# Patient Record
Sex: MALE | Race: WHITE | NOT HISPANIC OR LATINO | Employment: OTHER | URBAN - METROPOLITAN AREA
[De-identification: names, ages, dates, MRNs, and addresses within clinical notes are randomized per-mention and may not be internally consistent; named-entity substitution may affect disease eponyms.]

---

## 2023-11-20 PROBLEM — R18.8 OTHER ASCITES: Status: ACTIVE | Noted: 2023-11-20

## 2023-11-20 PROBLEM — Z72.0 TOBACCO ABUSE: Status: ACTIVE | Noted: 2023-11-20

## 2023-11-20 PROBLEM — J41.0 SIMPLE CHRONIC BRONCHITIS: Status: ACTIVE | Noted: 2023-11-20

## 2023-11-20 PROBLEM — K80.20 CALCULUS OF GALLBLADDER WITHOUT CHOLECYSTITIS WITHOUT OBSTRUCTION: Status: ACTIVE | Noted: 2023-11-20

## 2023-11-20 PROBLEM — R91.1 PULMONARY NODULE: Status: ACTIVE | Noted: 2023-11-20

## 2023-12-21 ENCOUNTER — HOSPITAL ENCOUNTER (OUTPATIENT)
Dept: RADIOLOGY | Facility: HOSPITAL | Age: 68
Discharge: HOME | End: 2023-12-21
Attending: INTERNAL MEDICINE
Payer: MEDICARE

## 2023-12-21 DIAGNOSIS — R91.1 PULMONARY NODULE: ICD-10-CM

## 2023-12-21 LAB — GLUCOSE SERPL-MCNC: 148 MG/DL (ref 70–110)

## 2023-12-21 PROCEDURE — 78815 NM PET CT FDG SKULL BASE TO MID THIGH: ICD-10-PCS | Mod: 26,PI,, | Performed by: RADIOLOGY

## 2023-12-21 PROCEDURE — A9552 F18 FDG: HCPCS | Mod: PN

## 2023-12-21 PROCEDURE — 78815 PET IMAGE W/CT SKULL-THIGH: CPT | Mod: 26,PI,, | Performed by: RADIOLOGY

## 2023-12-21 NOTE — PROGRESS NOTES
PET Imaging Questionnaire    Are you a Diabetic? Recent Blood Sugar level? Yes    Are you anemic? Bone Marrow Stimulation Meds? No    Have you had a CT Scan, if so when & where was your last one? Yes -     Have you had a PET Scan, if so when & where was your last one? No    Chemotherapy or currently on Chemotherapy? No    Radiation therapy? No    Surgical History:   Past Surgical History:   Procedure Laterality Date    APPENDECTOMY      EAR REPAIR RIGHT      ENDOBRONCHIAL ULTRASOUND Bilateral 12/11/2023    Procedure: ENDOBRONCHIAL ULTRASOUND (EBUS);  Surgeon: Malik Ho MD;  Location: [facility], [place] OR;  Service: Pulmonary;  Laterality: Bilateral;    JAW RECONSTRUCTION      LARYNGOGEAL MASS REMOVED      MELANOMA EXCISION RIGHT SHOULDER      ROBOTIC BRONCHOSCOPY Bilateral 12/11/2023    Procedure: ROBOTIC BRONCHOSCOPY;  Surgeon: Malik Ho MD;  Location: [facility] [place] OR;  Service: Pulmonary;  Laterality: Bilateral;    SINUS SURGERY      VASCULAR SURGERY  2013    right leg arthrectomy; Dr. Steinberg        Have you been fasting for at least 6 hours? Yes    Is there any chance you may be pregnant or breastfeeding? No    Assay: 12.15 MCi@:7.30   Injection Site:rt ac     Residual: 1.08 mCi@: 7.32   Technologist: Geraldine Salmeron Injected:11.07mCi

## 2023-12-26 PROBLEM — C34.31 SQUAMOUS CELL CARCINOMA OF LOWER LOBE OF RIGHT LUNG: Status: ACTIVE | Noted: 2023-12-26

## 2023-12-26 PROBLEM — J43.2 CENTRILOBULAR EMPHYSEMA: Status: ACTIVE | Noted: 2023-12-26

## 2023-12-27 DIAGNOSIS — C34.31 SQUAMOUS CELL CARCINOMA OF LOWER LOBE OF RIGHT LUNG: Primary | ICD-10-CM

## 2024-01-02 NOTE — H&P (VIEW-ONLY)
History & Physical    SUBJECTIVE:     History of Present Illness:  Patient is a 68 y.o. male smoker with DM on insulin, COPD, melanoma and PAD (angioplasty w/o stent) here today for RLL squamous cell carcinoma. Admitted to [facility] with food poisoning and during work-up underwent CT abdomen which identified lung nodule measuring 1.2 cm previously 0.5cm on CT Jan 2023. Referred to pulmonary. Repeat CT 12/1/23 with interval progression to 1.5 cm RLL nodule. No other nodules or adenopathy. Bronchoscopy 12/11/23: RLL squamous cell carcinoma. 4L and 7 negative for malignancy. PET with RLL nodule SUV 6.1, no mediastinal or hilar avidity. Chronic cough with phlegm. Claudication ~ 50 yds. Occasional resting leg pain. Not on blood thinners. On pentoxifylline.     Smoker. 2-3ppd smoker however down to 1.5 ppd.   PSH: jaw reconstruction with plates - tracheostomy with decannulation within month of placement, excision of melanoma R shoulder, appendectomy, laryngeal vs VC mass biopsy      Good neck extension.   No issues with placement of 9-0 ETT. Good view of cords.     Chief Complaint   Patient presents with    Consult     Review of patient's allergies indicates:  No Known Allergies    Current Outpatient Medications   Medication Sig Dispense Refill    albuterol (PROVENTIL/VENTOLIN HFA) 90 mcg/actuation inhaler INHALE 2 INHALATIONS BY ORAL INHALATION FOUR TIMES A DAY AS NEEDED      aspirin (ECOTRIN) 81 MG EC tablet Take 1 tablet by mouth every morning.      buPROPion (WELLBUTRIN SR) 150 MG TBSR 12 hr tablet 150 mg.      EScitalopram oxalate (LEXAPRO) 20 MG tablet Take 1 tablet by mouth every evening.      fluticasone propionate (FLONASE) 50 mcg/actuation nasal spray 1 spray by Nasal route daily as needed.      JANUMET 50-1,000 mg per tablet Take 1 tablet by mouth 2 (two) times daily with meals.      LANTUS SOLOSTAR U-100 INSULIN glargine 100 units/mL SubQ pen Inject 20 Units into the skin every evening.      lisinopriL  "(PRINIVIL,ZESTRIL) 5 MG tablet Take 1 tablet by mouth once daily.      pen needle, diabetic 32 gauge x 5/32" Ndle Inject 2 each into the skin.      pentoxifylline (TRENTAL) 400 mg TbSR Take 1 tablet by mouth 3 (three) times daily with meals.      rosuvastatin (CRESTOR) 10 MG tablet Take 1 tablet by mouth every evening.      tiotropium-olodateroL (STIOLTO RESPIMAT) 2.5-2.5 mcg/actuation Mist Inhale 1 puff into the lungs once daily. Controller 1 g 5    VICTOZA 2-LADARIUS 0.6 mg/0.1 mL (18 mg/3 mL) PnIj pen INJECT 0.6 MG INTO THE SKIN IN THE MORNING.       No current facility-administered medications for this visit.       Past Medical History:   Diagnosis Date    Cancer     MELANOMA AND SQUAMOUS CELL    COPD (chronic obstructive pulmonary disease)     Diabetes mellitus     Pulmonary nodule     Renal disorder 11/2023    ZAC     Past Surgical History:   Procedure Laterality Date    APPENDECTOMY      EAR REPAIR RIGHT      ENDOBRONCHIAL ULTRASOUND Bilateral 12/11/2023    Procedure: ENDOBRONCHIAL ULTRASOUND (EBUS);  Surgeon: Malik Ho MD;  Location: [facility] OR;  Service: Pulmonary;  Laterality: Bilateral;    JAW RECONSTRUCTION      LARYNGOGEAL MASS REMOVED      MELANOMA EXCISION RIGHT SHOULDER      ROBOTIC BRONCHOSCOPY Bilateral 12/11/2023    Procedure: ROBOTIC BRONCHOSCOPY;  Surgeon: Malik Ho MD;  Location: [facility] OR;  Service: Pulmonary;  Laterality: Bilateral;    SINUS SURGERY      VASCULAR SURGERY  2013    right leg arthrectomy; Dr. Steinberg     Family History   Problem Relation Age of Onset    Cancer Mother         BREAST CANCER    Asthma Mother     Hypertension Father     Cancer Father         LUNG CANCER ANGENT ORANGE EXPOSURE     Social History     Tobacco Use    Smoking status: Every Day     Current packs/day: 1.00     Average packs/day: 2.9 packs/day for 54.0 years (156.0 ttl pk-yrs)     Types: Cigarettes     Start date: 1970    Smokeless tobacco: Never   Substance Use Topics    Alcohol use: Not Currently    " "Drug use: Not Currently        Review of Systems:  Review of Systems   Constitutional:  Negative for fatigue and fever.   Respiratory:  Positive for cough. Negative for shortness of breath.    Cardiovascular:         Leg pain with walking    Gastrointestinal:  Negative for abdominal pain, nausea and vomiting.   Genitourinary:  Negative for difficulty urinating.   Musculoskeletal:  Negative for arthralgias.   Skin:  Negative for color change and rash.   Neurological:  Negative for dizziness.   Hematological:  Negative for adenopathy.   Psychiatric/Behavioral:  Negative for agitation. The patient is not nervous/anxious.        OBJECTIVE:     Vital Signs (Most Recent)  Vitals:    01/03/24 0940   BP: 137/74   Pulse: 64   SpO2: 98%   Weight: 81.2 kg (179 lb 0.2 oz)   Height: 6' 1" (1.854 m)   PainSc: 0-No pain       Physical Exam:  Physical Exam  Constitutional:       Appearance: Normal appearance.   HENT:      Head: Atraumatic.   Eyes:      Extraocular Movements: Extraocular movements intact.   Cardiovascular:      Rate and Rhythm: Normal rate and regular rhythm.      Pulses: Normal pulses.      Heart sounds: Normal heart sounds.   Pulmonary:      Effort: Pulmonary effort is normal.       Abdominal:      Palpations: Abdomen is soft.   Musculoskeletal:         General: No swelling. Normal range of motion.      Cervical back: Normal range of motion.      Right lower leg: No edema.      Left lower leg: No edema.   Skin:     General: Skin is warm and dry.   Neurological:      General: No focal deficit present.      Mental Status: He is alert and oriented to person, place, and time.   Psychiatric:         Mood and Affect: Mood normal.         Behavior: Behavior normal.         Chest CT 12/1/23:   Right lower lobe nodule measuring 15 mm.  Prior report from CT of the chest dated 01/31/2023 describes a 5 mm right lower lobe nodule.  These images are unavailable for comparison.  Nevertheless this is concerning for malignancy.  " Recommend PET-CT versus percutaneous sampling.     PET 12/21/23:   Emphysematous changes lungs are identified.  Within the right lower lobe a hypermetabolic nodule is identified.  This has a maximum SUV of 6.1.  This is best identified on image number 133. This has spiculated margins measures 1.7 by 1.7 cm.     PFTS      ASSESSMENT/PLAN:     Patient is a 68 y.o. male smoker with DM, COPD, melanoma and PAD (angioplasty w/o stent) here today for RLL squamous cell carcinoma.  Active heavy smoking places the patient at significant perioperative risk of cardiopulmonary complications including cardiac arrhythmia, acute cardiac ischemia, pneumonia, and acute respiratory failure  .  PLAN:Plan     Given active smoking history coupled with extensive peripheral arterial disease, the patient needs a stress echo for pre-operative clearance.   Pre-op labs today.   Discuss in tumor board.   Pending, proceed to OR for right robotic assisted lower lobectomy versus segmentectomy with MLND, possible thoracotomy.   I engaged in an extensive discussion with the patient and his wife about the direct correlation between smoking and lung cancer as well as peripheral arterial disease.  I informed the patient that he has clinical findings consistent with rest pain which will progressive he continues with smoking.  I also informed him that he is at an increased perioperative risk due to his continued heavy smoking.  I strongly encouraged him to engage in smoking cessation in an effort to optimize his perioperative outcomes.  Appropriate patient education regarding the eun-operative period as well as intraoperative details were discussed. Risks, including but not limited to, bleeding, infection, pain and anesthetic complication were discussed. Patient was given the opportunity to ask questions and to have those questions answered to their satisfaction. Patient verbalized understanding to both procedure and associated risks. Consent was  obtained.

## 2024-01-02 NOTE — PROGRESS NOTES
History & Physical    SUBJECTIVE:     History of Present Illness:  Patient is a 68 y.o. male smoker with DM on insulin, COPD, melanoma and PAD (angioplasty w/o stent) here today for RLL squamous cell carcinoma. Admitted to [facility] with food poisoning and during work-up underwent CT abdomen which identified lung nodule measuring 1.2 cm previously 0.5cm on CT Jan 2023. Referred to pulmonary. Repeat CT 12/1/23 with interval progression to 1.5 cm RLL nodule. No other nodules or adenopathy. Bronchoscopy 12/11/23: RLL squamous cell carcinoma. 4L and 7 negative for malignancy. PET with RLL nodule SUV 6.1, no mediastinal or hilar avidity. Chronic cough with phlegm. Claudication ~ 50 yds. Occasional resting leg pain. Not on blood thinners. On pentoxifylline.     Smoker. 2-3ppd smoker however down to 1.5 ppd.   PSH: jaw reconstruction with plates - tracheostomy with decannulation within month of placement, excision of melanoma R shoulder, appendectomy, laryngeal vs VC mass biopsy      Good neck extension.   No issues with placement of 9-0 ETT. Good view of cords.     Chief Complaint   Patient presents with    Consult     Review of patient's allergies indicates:  No Known Allergies    Current Outpatient Medications   Medication Sig Dispense Refill    albuterol (PROVENTIL/VENTOLIN HFA) 90 mcg/actuation inhaler INHALE 2 INHALATIONS BY ORAL INHALATION FOUR TIMES A DAY AS NEEDED      aspirin (ECOTRIN) 81 MG EC tablet Take 1 tablet by mouth every morning.      buPROPion (WELLBUTRIN SR) 150 MG TBSR 12 hr tablet 150 mg.      EScitalopram oxalate (LEXAPRO) 20 MG tablet Take 1 tablet by mouth every evening.      fluticasone propionate (FLONASE) 50 mcg/actuation nasal spray 1 spray by Nasal route daily as needed.      JANUMET 50-1,000 mg per tablet Take 1 tablet by mouth 2 (two) times daily with meals.      LANTUS SOLOSTAR U-100 INSULIN glargine 100 units/mL SubQ pen Inject 20 Units into the skin every evening.      lisinopriL  "(PRINIVIL,ZESTRIL) 5 MG tablet Take 1 tablet by mouth once daily.      pen needle, diabetic 32 gauge x 5/32" Ndle Inject 2 each into the skin.      pentoxifylline (TRENTAL) 400 mg TbSR Take 1 tablet by mouth 3 (three) times daily with meals.      rosuvastatin (CRESTOR) 10 MG tablet Take 1 tablet by mouth every evening.      tiotropium-olodateroL (STIOLTO RESPIMAT) 2.5-2.5 mcg/actuation Mist Inhale 1 puff into the lungs once daily. Controller 1 g 5    VICTOZA 2-LADARIUS 0.6 mg/0.1 mL (18 mg/3 mL) PnIj pen INJECT 0.6 MG INTO THE SKIN IN THE MORNING.       No current facility-administered medications for this visit.       Past Medical History:   Diagnosis Date    Cancer     MELANOMA AND SQUAMOUS CELL    COPD (chronic obstructive pulmonary disease)     Diabetes mellitus     Pulmonary nodule     Renal disorder 11/2023    ZAC     Past Surgical History:   Procedure Laterality Date    APPENDECTOMY      EAR REPAIR RIGHT      ENDOBRONCHIAL ULTRASOUND Bilateral 12/11/2023    Procedure: ENDOBRONCHIAL ULTRASOUND (EBUS);  Surgeon: Malik Ho MD;  Location: [facility] OR;  Service: Pulmonary;  Laterality: Bilateral;    JAW RECONSTRUCTION      LARYNGOGEAL MASS REMOVED      MELANOMA EXCISION RIGHT SHOULDER      ROBOTIC BRONCHOSCOPY Bilateral 12/11/2023    Procedure: ROBOTIC BRONCHOSCOPY;  Surgeon: Malik Ho MD;  Location: [facility] OR;  Service: Pulmonary;  Laterality: Bilateral;    SINUS SURGERY      VASCULAR SURGERY  2013    right leg arthrectomy; Dr. Steinberg     Family History   Problem Relation Age of Onset    Cancer Mother         BREAST CANCER    Asthma Mother     Hypertension Father     Cancer Father         LUNG CANCER ANGENT ORANGE EXPOSURE     Social History     Tobacco Use    Smoking status: Every Day     Current packs/day: 1.00     Average packs/day: 2.9 packs/day for 54.0 years (156.0 ttl pk-yrs)     Types: Cigarettes     Start date: 1970    Smokeless tobacco: Never   Substance Use Topics    Alcohol use: Not Currently    " "Drug use: Not Currently        Review of Systems:  Review of Systems   Constitutional:  Negative for fatigue and fever.   Respiratory:  Positive for cough. Negative for shortness of breath.    Cardiovascular:         Leg pain with walking    Gastrointestinal:  Negative for abdominal pain, nausea and vomiting.   Genitourinary:  Negative for difficulty urinating.   Musculoskeletal:  Negative for arthralgias.   Skin:  Negative for color change and rash.   Neurological:  Negative for dizziness.   Hematological:  Negative for adenopathy.   Psychiatric/Behavioral:  Negative for agitation. The patient is not nervous/anxious.        OBJECTIVE:     Vital Signs (Most Recent)  Vitals:    01/03/24 0940   BP: 137/74   Pulse: 64   SpO2: 98%   Weight: 81.2 kg (179 lb 0.2 oz)   Height: 6' 1" (1.854 m)   PainSc: 0-No pain       Physical Exam:  Physical Exam  Constitutional:       Appearance: Normal appearance.   HENT:      Head: Atraumatic.   Eyes:      Extraocular Movements: Extraocular movements intact.   Cardiovascular:      Rate and Rhythm: Normal rate and regular rhythm.      Pulses: Normal pulses.      Heart sounds: Normal heart sounds.   Pulmonary:      Effort: Pulmonary effort is normal.       Abdominal:      Palpations: Abdomen is soft.   Musculoskeletal:         General: No swelling. Normal range of motion.      Cervical back: Normal range of motion.      Right lower leg: No edema.      Left lower leg: No edema.   Skin:     General: Skin is warm and dry.   Neurological:      General: No focal deficit present.      Mental Status: He is alert and oriented to person, place, and time.   Psychiatric:         Mood and Affect: Mood normal.         Behavior: Behavior normal.         Chest CT 12/1/23:   Right lower lobe nodule measuring 15 mm.  Prior report from CT of the chest dated 01/31/2023 describes a 5 mm right lower lobe nodule.  These images are unavailable for comparison.  Nevertheless this is concerning for malignancy.  " Recommend PET-CT versus percutaneous sampling.     PET 12/21/23:   Emphysematous changes lungs are identified.  Within the right lower lobe a hypermetabolic nodule is identified.  This has a maximum SUV of 6.1.  This is best identified on image number 133. This has spiculated margins measures 1.7 by 1.7 cm.     PFTS      ASSESSMENT/PLAN:     Patient is a 68 y.o. male smoker with DM, COPD, melanoma and PAD (angioplasty w/o stent) here today for RLL squamous cell carcinoma.  Active heavy smoking places the patient at significant perioperative risk of cardiopulmonary complications including cardiac arrhythmia, acute cardiac ischemia, pneumonia, and acute respiratory failure  .  PLAN:Plan     Given active smoking history coupled with extensive peripheral arterial disease, the patient needs a stress echo for pre-operative clearance.   Pre-op labs today.   Discuss in tumor board.   Pending, proceed to OR for right robotic assisted lower lobectomy versus segmentectomy with MLND, possible thoracotomy.   I engaged in an extensive discussion with the patient and his wife about the direct correlation between smoking and lung cancer as well as peripheral arterial disease.  I informed the patient that he has clinical findings consistent with rest pain which will progressive he continues with smoking.  I also informed him that he is at an increased perioperative risk due to his continued heavy smoking.  I strongly encouraged him to engage in smoking cessation in an effort to optimize his perioperative outcomes.  Appropriate patient education regarding the eun-operative period as well as intraoperative details were discussed. Risks, including but not limited to, bleeding, infection, pain and anesthetic complication were discussed. Patient was given the opportunity to ask questions and to have those questions answered to their satisfaction. Patient verbalized understanding to both procedure and associated risks. Consent was  obtained.

## 2024-01-03 ENCOUNTER — OFFICE VISIT (OUTPATIENT)
Dept: CARDIOTHORACIC SURGERY | Facility: CLINIC | Age: 69
End: 2024-01-03
Payer: MEDICARE

## 2024-01-03 ENCOUNTER — LAB VISIT (OUTPATIENT)
Dept: LAB | Facility: HOSPITAL | Age: 69
End: 2024-01-03
Payer: MEDICARE

## 2024-01-03 VITALS
HEART RATE: 64 BPM | SYSTOLIC BLOOD PRESSURE: 137 MMHG | OXYGEN SATURATION: 98 % | DIASTOLIC BLOOD PRESSURE: 74 MMHG | BODY MASS INDEX: 23.72 KG/M2 | WEIGHT: 179 LBS | HEIGHT: 73 IN

## 2024-01-03 DIAGNOSIS — D68.9 COAGULOPATHY: ICD-10-CM

## 2024-01-03 DIAGNOSIS — I73.9 CLAUDICATION OF BOTH LOWER EXTREMITIES: ICD-10-CM

## 2024-01-03 DIAGNOSIS — C34.31 SQUAMOUS CELL CARCINOMA OF LOWER LOBE OF RIGHT LUNG: Primary | ICD-10-CM

## 2024-01-03 DIAGNOSIS — F17.200 CURRENT SMOKER: ICD-10-CM

## 2024-01-03 DIAGNOSIS — C34.31 SQUAMOUS CELL CARCINOMA OF LOWER LOBE OF RIGHT LUNG: ICD-10-CM

## 2024-01-03 DIAGNOSIS — Z01.810 PRE-OPERATIVE CARDIOVASCULAR EXAMINATION: ICD-10-CM

## 2024-01-03 DIAGNOSIS — Z01.818 PRE-OP EVALUATION: Primary | ICD-10-CM

## 2024-01-03 DIAGNOSIS — I70.223 REST PAIN OF BOTH LOWER EXTREMITIES DUE TO ATHEROSCLEROSIS: ICD-10-CM

## 2024-01-03 DIAGNOSIS — Z01.810 PRE-OPERATIVE CARDIOVASCULAR EXAMINATION: Primary | ICD-10-CM

## 2024-01-03 LAB
ALBUMIN SERPL BCP-MCNC: 3.8 G/DL (ref 3.5–5.2)
ALP SERPL-CCNC: 52 U/L (ref 55–135)
ALT SERPL W/O P-5'-P-CCNC: 14 U/L (ref 10–44)
ANION GAP SERPL CALC-SCNC: 5 MMOL/L (ref 8–16)
APTT PPP: 25.3 SEC (ref 21–32)
AST SERPL-CCNC: 16 U/L (ref 10–40)
BASOPHILS # BLD AUTO: 0.06 K/UL (ref 0–0.2)
BASOPHILS NFR BLD: 1.1 % (ref 0–1.9)
BILIRUB SERPL-MCNC: 0.4 MG/DL (ref 0.1–1)
BUN SERPL-MCNC: 9 MG/DL (ref 8–23)
CALCIUM SERPL-MCNC: 9.1 MG/DL (ref 8.7–10.5)
CHLORIDE SERPL-SCNC: 105 MMOL/L (ref 95–110)
CO2 SERPL-SCNC: 30 MMOL/L (ref 23–29)
CREAT SERPL-MCNC: 0.9 MG/DL (ref 0.5–1.4)
DIFFERENTIAL METHOD BLD: ABNORMAL
EOSINOPHIL # BLD AUTO: 0.1 K/UL (ref 0–0.5)
EOSINOPHIL NFR BLD: 2.6 % (ref 0–8)
ERYTHROCYTE [DISTWIDTH] IN BLOOD BY AUTOMATED COUNT: 13.8 % (ref 11.5–14.5)
EST. GFR  (NO RACE VARIABLE): >60 ML/MIN/1.73 M^2
GLUCOSE SERPL-MCNC: 119 MG/DL (ref 70–110)
HCT VFR BLD AUTO: 48 % (ref 40–54)
HGB BLD-MCNC: 16 G/DL (ref 14–18)
IMM GRANULOCYTES # BLD AUTO: 0.01 K/UL (ref 0–0.04)
IMM GRANULOCYTES NFR BLD AUTO: 0.2 % (ref 0–0.5)
INR PPP: 0.9 (ref 0.8–1.2)
LYMPHOCYTES # BLD AUTO: 1.5 K/UL (ref 1–4.8)
LYMPHOCYTES NFR BLD: 27.3 % (ref 18–48)
MCH RBC QN AUTO: 29.7 PG (ref 27–31)
MCHC RBC AUTO-ENTMCNC: 33.3 G/DL (ref 32–36)
MCV RBC AUTO: 89 FL (ref 82–98)
MONOCYTES # BLD AUTO: 0.3 K/UL (ref 0.3–1)
MONOCYTES NFR BLD: 5.6 % (ref 4–15)
NEUTROPHILS # BLD AUTO: 3.4 K/UL (ref 1.8–7.7)
NEUTROPHILS NFR BLD: 63.2 % (ref 38–73)
NRBC BLD-RTO: 0 /100 WBC
PLATELET # BLD AUTO: 148 K/UL (ref 150–450)
PMV BLD AUTO: 10.4 FL (ref 9.2–12.9)
POTASSIUM SERPL-SCNC: 4.9 MMOL/L (ref 3.5–5.1)
PREALB SERPL-MCNC: 27 MG/DL (ref 20–43)
PROT SERPL-MCNC: 6.8 G/DL (ref 6–8.4)
PROTHROMBIN TIME: 10.1 SEC (ref 9–12.5)
RBC # BLD AUTO: 5.38 M/UL (ref 4.6–6.2)
SODIUM SERPL-SCNC: 140 MMOL/L (ref 136–145)
WBC # BLD AUTO: 5.39 K/UL (ref 3.9–12.7)

## 2024-01-03 PROCEDURE — 1101F PT FALLS ASSESS-DOCD LE1/YR: CPT | Mod: CPTII,S$GLB,, | Performed by: THORACIC SURGERY (CARDIOTHORACIC VASCULAR SURGERY)

## 2024-01-03 PROCEDURE — 80053 COMPREHEN METABOLIC PANEL: CPT | Performed by: PHYSICIAN ASSISTANT

## 2024-01-03 PROCEDURE — 84134 ASSAY OF PREALBUMIN: CPT | Performed by: PHYSICIAN ASSISTANT

## 2024-01-03 PROCEDURE — 85730 THROMBOPLASTIN TIME PARTIAL: CPT | Performed by: PHYSICIAN ASSISTANT

## 2024-01-03 PROCEDURE — 3078F DIAST BP <80 MM HG: CPT | Mod: CPTII,S$GLB,, | Performed by: THORACIC SURGERY (CARDIOTHORACIC VASCULAR SURGERY)

## 2024-01-03 PROCEDURE — 36415 COLL VENOUS BLD VENIPUNCTURE: CPT | Performed by: PHYSICIAN ASSISTANT

## 2024-01-03 PROCEDURE — 3075F SYST BP GE 130 - 139MM HG: CPT | Mod: CPTII,S$GLB,, | Performed by: THORACIC SURGERY (CARDIOTHORACIC VASCULAR SURGERY)

## 2024-01-03 PROCEDURE — 85025 COMPLETE CBC W/AUTO DIFF WBC: CPT | Performed by: PHYSICIAN ASSISTANT

## 2024-01-03 PROCEDURE — 3008F BODY MASS INDEX DOCD: CPT | Mod: CPTII,S$GLB,, | Performed by: THORACIC SURGERY (CARDIOTHORACIC VASCULAR SURGERY)

## 2024-01-03 PROCEDURE — 99999 PR PBB SHADOW E&M-EST. PATIENT-LVL V: CPT | Mod: PBBFAC,,, | Performed by: THORACIC SURGERY (CARDIOTHORACIC VASCULAR SURGERY)

## 2024-01-03 PROCEDURE — 1159F MED LIST DOCD IN RCRD: CPT | Mod: CPTII,S$GLB,, | Performed by: THORACIC SURGERY (CARDIOTHORACIC VASCULAR SURGERY)

## 2024-01-03 PROCEDURE — 3288F FALL RISK ASSESSMENT DOCD: CPT | Mod: CPTII,S$GLB,, | Performed by: THORACIC SURGERY (CARDIOTHORACIC VASCULAR SURGERY)

## 2024-01-03 PROCEDURE — 99205 OFFICE O/P NEW HI 60 MIN: CPT | Mod: S$GLB,,, | Performed by: THORACIC SURGERY (CARDIOTHORACIC VASCULAR SURGERY)

## 2024-01-03 PROCEDURE — 1126F AMNT PAIN NOTED NONE PRSNT: CPT | Mod: CPTII,S$GLB,, | Performed by: THORACIC SURGERY (CARDIOTHORACIC VASCULAR SURGERY)

## 2024-01-03 PROCEDURE — 85610 PROTHROMBIN TIME: CPT | Performed by: PHYSICIAN ASSISTANT

## 2024-01-05 ENCOUNTER — TELEPHONE (OUTPATIENT)
Dept: CARDIOLOGY | Facility: HOSPITAL | Age: 69
End: 2024-01-05

## 2024-01-05 DIAGNOSIS — Z01.810 PRE-OPERATIVE CARDIOVASCULAR EXAMINATION: Primary | ICD-10-CM

## 2024-01-08 ENCOUNTER — HOSPITAL ENCOUNTER (OUTPATIENT)
Dept: RADIOLOGY | Facility: HOSPITAL | Age: 69
Discharge: HOME | End: 2024-01-08
Attending: PHYSICIAN ASSISTANT
Payer: MEDICARE

## 2024-01-08 ENCOUNTER — CLINICAL SUPPORT (OUTPATIENT)
Dept: CARDIOLOGY | Facility: HOSPITAL | Age: 69
End: 2024-01-08
Attending: PHYSICIAN ASSISTANT
Payer: MEDICARE

## 2024-01-08 VITALS — HEIGHT: 73 IN | WEIGHT: 179 LBS | BODY MASS INDEX: 23.72 KG/M2

## 2024-01-08 DIAGNOSIS — Z01.810 PRE-OPERATIVE CARDIOVASCULAR EXAMINATION: ICD-10-CM

## 2024-01-08 LAB
AV INDEX (PROSTH): 0.45
AV MEAN GRADIENT: 12 MMHG
AV PEAK GRADIENT: 21 MMHG
AV VALVE AREA BY VELOCITY RATIO: 1.33 CM²
AV VALVE AREA: 1.41 CM²
AV VELOCITY RATIO: 0.42
BSA FOR ECHO PROCEDURE: 2.04 M2
CV ECHO LV RWT: 0.52 CM
CV PHARM DOSE: 0.4 MG
CV STRESS BASE HR: 58 BPM
DIASTOLIC BLOOD PRESSURE: 47 MMHG
DOP CALC AO PEAK VEL: 2.31 M/S
DOP CALC AO VTI: 56.6 CM
DOP CALC LVOT AREA: 3.1 CM2
DOP CALC LVOT DIAMETER: 2 CM
DOP CALC LVOT PEAK VEL: 0.98 M/S
DOP CALC LVOT STROKE VOLUME: 79.76 CM3
DOP CALC MV VTI: 38.1 CM
DOP CALCLVOT PEAK VEL VTI: 25.4 CM
E WAVE DECELERATION TIME: 261 MSEC
E/A RATIO: 1.21
E/E' RATIO: 9.3 M/S
ECHO LV POSTERIOR WALL: 1.09 CM (ref 0.6–1.1)
FRACTIONAL SHORTENING: 27 % (ref 28–44)
INTERVENTRICULAR SEPTUM: 0.92 CM (ref 0.6–1.1)
LEFT INTERNAL DIMENSION IN SYSTOLE: 3.07 CM (ref 2.1–4)
LEFT VENTRICLE DIASTOLIC VOLUME INDEX: 38.34 ML/M2
LEFT VENTRICLE DIASTOLIC VOLUME: 78.6 ML
LEFT VENTRICLE MASS INDEX: 67 G/M2
LEFT VENTRICLE SYSTOLIC VOLUME INDEX: 18 ML/M2
LEFT VENTRICLE SYSTOLIC VOLUME: 37 ML
LEFT VENTRICULAR INTERNAL DIMENSION IN DIASTOLE: 4.2 CM (ref 3.5–6)
LEFT VENTRICULAR MASS: 138.21 G
LV LATERAL E/E' RATIO: 8.45 M/S
LV SEPTAL E/E' RATIO: 10.33 M/S
LVOT MG: 2 MMHG
LVOT MV: 0.66 CM/S
MV MEAN GRADIENT: 1 MMHG
MV PEAK A VEL: 0.77 M/S
MV PEAK E VEL: 0.93 M/S
MV PEAK GRADIENT: 3 MMHG
MV STENOSIS PRESSURE HALF TIME: 158 MS
MV VALVE AREA BY CONTINUITY EQUATION: 2.09 CM2
MV VALVE AREA P 1/2 METHOD: 1.39 CM2
OHS CV CPX 1 MINUTE RECOVERY HEART RATE: 77 BPM
OHS CV CPX 85 PERCENT MAX PREDICTED HEART RATE MALE: 129
OHS CV CPX MAX PREDICTED HEART RATE: 152
OHS CV CPX PATIENT IS FEMALE: 0
OHS CV CPX PATIENT IS MALE: 1
OHS CV CPX PEAK DIASTOLIC BLOOD PRESSURE: 69 MMHG
OHS CV CPX PEAK HEAR RATE: 77 BPM
OHS CV CPX PEAK RATE PRESSURE PRODUCT: 9394
OHS CV CPX PEAK SYSTOLIC BLOOD PRESSURE: 122 MMHG
OHS CV CPX PERCENT MAX PREDICTED HEART RATE ACHIEVED: 51
OHS CV CPX RATE PRESSURE PRODUCT PRESENTING: 1044
OHS LV EJECTION FRACTION SIMPSONS BIPLANE MOD: 55 %
PISA TR MAX VEL: 1.64 M/S
PV MV: 0.66 M/S
PV PEAK GRADIENT: 4 MMHG
PV PEAK VELOCITY: 0.98 M/S
RA PRESSURE ESTIMATED: 3 MMHG
RV TB RVSP: 5 MMHG
RV TISSUE DOPPLER FREE WALL SYSTOLIC VELOCITY 1 (APICAL 4 CHAMBER VIEW): 12.9 CM/S
SINUS: 2.96 CM
SYSTOLIC BLOOD PRESSURE: 18 MMHG
TDI LATERAL: 0.11 M/S
TDI SEPTAL: 0.09 M/S
TDI: 0.1 M/S
TR MAX PG: 11 MMHG
TRICUSPID ANNULAR PLANE SYSTOLIC EXCURSION: 2.17 CM
TV REST PULMONARY ARTERY PRESSURE: 14 MMHG
Z-SCORE OF LEFT VENTRICULAR DIMENSION IN END DIASTOLE: -3.81
Z-SCORE OF LEFT VENTRICULAR DIMENSION IN END SYSTOLE: -1.62

## 2024-01-08 PROCEDURE — 93306 TTE W/DOPPLER COMPLETE: CPT | Mod: 26,,, | Performed by: GENERAL PRACTICE

## 2024-01-08 PROCEDURE — 93018 CV STRESS TEST I&R ONLY: CPT | Mod: ,,, | Performed by: GENERAL PRACTICE

## 2024-01-08 PROCEDURE — 93306 TTE W/DOPPLER COMPLETE: CPT

## 2024-01-08 PROCEDURE — 93017 CV STRESS TEST TRACING ONLY: CPT

## 2024-01-08 PROCEDURE — A9502 TC99M TETROFOSMIN: HCPCS

## 2024-01-08 PROCEDURE — 63600175 PHARM REV CODE 636 W HCPCS: Performed by: PHYSICIAN ASSISTANT

## 2024-01-08 PROCEDURE — 93016 CV STRESS TEST SUPVJ ONLY: CPT | Mod: ,,, | Performed by: GENERAL PRACTICE

## 2024-01-08 RX ORDER — REGADENOSON 0.08 MG/ML
0.4 INJECTION, SOLUTION INTRAVENOUS
Status: COMPLETED | OUTPATIENT
Start: 2024-01-08 | End: 2024-01-08

## 2024-01-08 RX ADMIN — REGADENOSON 0.4 MG: 0.08 INJECTION, SOLUTION INTRAVENOUS at 08:01

## 2024-01-17 ENCOUNTER — TELEPHONE (OUTPATIENT)
Dept: CARDIOTHORACIC SURGERY | Facility: CLINIC | Age: 69
End: 2024-01-17
Payer: MEDICARE

## 2024-01-17 ENCOUNTER — ANESTHESIA EVENT (OUTPATIENT)
Dept: SURGERY | Facility: HOSPITAL | Age: 69
DRG: 164 | End: 2024-01-17
Payer: MEDICARE

## 2024-01-17 NOTE — ANESTHESIA PREPROCEDURE EVALUATION
PAPER CONSENT IN CHART    [facility]  Anesthesia Pre-Operative Evaluation         Patient Name: Malik Gardiner  YOB: 1955  MRN: [id number]    SUBJECTIVE:     Pre-operative evaluation for Procedure(s) (LRB):  XI ROBOTIC RIGHT LOWER LOBECTOMY,LUNG (Right)  LYMPHADENECTOMY (Right)     01/17/2024    Malik Gardiner is a 68 y.o. male w/ a significant PMHx of T2DM (a1c 8.4), PAD, 100 PY smoking (quit 2 weeks ago), and COPD. Dx with RLL SCC.    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 He uses an inhaler and rescue inhaler for COPD (last used rescue 1 week ago).     NPO since 2300 last night    1 20G PIV left wrist    Patient now presents for the above procedure(s).    Stress test and echo 2024 normal    Prev airway:  Mask Ventilation:  Easy mask    Attempts:  1    Attempted By:  CRNA    Method of Intubation:  Video laryngoscopy    Blade:  Kat 4    Laryngeal View Grade: Grade I - full view of cords      Difficult Airway Encountered?: No      Complications:  None    Airway Device:  Oral endotracheal tube    Airway Device Size:  9.0    Drips: None documented.        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     Current Outpatient Medications on File Prior to Encounter   Medication Sig Dispense Refill    EScitalopram oxalate (LEXAPRO) 20 MG tablet Take 1 tablet by mouth every evening.      JANUMET 50-1,000 mg per tablet Take 1 tablet by mouth 2 (two) times daily with meals.       "pentoxifylline (TRENTAL) 400 mg TbSR Take 1 tablet by mouth 3 (three) times daily with meals.      rosuvastatin (CRESTOR) 10 MG tablet Take 1 tablet by mouth every evening.      albuterol (PROVENTIL/VENTOLIN HFA) 90 mcg/actuation inhaler INHALE 2 INHALATIONS BY ORAL INHALATION FOUR TIMES A DAY AS NEEDED      aspirin (ECOTRIN) 81 MG EC tablet Take 1 tablet by mouth every morning.      buPROPion (WELLBUTRIN SR) 150 MG TBSR 12 hr tablet 150 mg.      fluticasone propionate (FLONASE) 50 mcg/actuation nasal spray 1 spray by Nasal route daily as needed.      LANTUS SOLOSTAR U-100 INSULIN glargine 100 units/mL SubQ pen Inject 20 Units into the skin every evening.      lisinopriL (PRINIVIL,ZESTRIL) 5 MG tablet Take 1 tablet by mouth once daily.      pen needle, diabetic 32 gauge x 5/32" Ndle Inject 2 each into the skin.      tiotropium-olodateroL (STIOLTO RESPIMAT) 2.5-2.5 mcg/actuation Mist Inhale 1 puff into the lungs once daily. Controller 1 g 5    VICTOZA 2-LADARIUS 0.6 mg/0.1 mL (18 mg/3 mL) PnIj pen INJECT 0.6 MG INTO THE SKIN IN THE MORNING.         Past Surgical History:   Procedure Laterality Date    APPENDECTOMY      EAR REPAIR RIGHT      ENDOBRONCHIAL ULTRASOUND Bilateral 12/11/2023    Procedure: ENDOBRONCHIAL ULTRASOUND (EBUS);  Surgeon: Malik Ho MD;  Location: [facility] OR;  Service: Pulmonary;  Laterality: Bilateral;    JAW RECONSTRUCTION      LARYNGOGEAL MASS REMOVED      MELANOMA EXCISION RIGHT SHOULDER      ROBOTIC BRONCHOSCOPY Bilateral 12/11/2023    Procedure: ROBOTIC BRONCHOSCOPY;  Surgeon: Malik Ho MD;  Location: [facility] OR;  Service: Pulmonary;  Laterality: Bilateral;    SINUS SURGERY      VASCULAR SURGERY  2013    right leg arthrectomy; Dr. Steinberg       Social History     Socioeconomic History    Marital status:    Tobacco Use    Smoking status: Every Day     Current packs/day: 1.00     Average packs/day: 2.9 packs/day for 54.0 years (156.0 ttl pk-yrs)     Types: Cigarettes     Start date: " 1970    Smokeless tobacco: Never   Substance and Sexual Activity    Alcohol use: Not Currently    Drug use: Not Currently     Social Determinants of Health     Financial Resource Strain: Low Risk  (1/10/2024)    Overall Financial Resource Strain (CARDIA)     Difficulty of Paying Living Expenses: Not hard at all   Food Insecurity: No Food Insecurity (1/10/2024)    Hunger Vital Sign     Worried About Running Out of Food in the Last Year: Never true     Ran Out of Food in the Last Year: Never true   Transportation Needs: No Transportation Needs (1/10/2024)    PRAPARE - Transportation     Lack of Transportation (Medical): No     Lack of Transportation (Non-Medical): No   Physical Activity: Inactive (1/10/2024)    Exercise Vital Sign     Days of Exercise per Week: 0 days     Minutes of Exercise per Session: 0 min   Stress: Stress Concern Present (1/10/2024)    [place] citizen [employer] Occupational [employer] - Occupational Stress Questionnaire     Feeling of Stress : To some extent   Social Connections: Unknown (1/10/2024)    Social Connection and Isolation Panel [NHANES]     Frequency of Communication with Friends and Family: More than three times a week     Frequency of Social Gatherings with Friends and Family: More than three times a week     Active Member of Clubs or Organizations: No     Attends Club or Organization Meetings: Never     Marital Status:    Housing Stability: Low Risk  (1/10/2024)    Housing Stability Vital Sign     Unable to Pay for Housing in the Last Year: No     Number of Places Lived in the Last Year: 1     Unstable Housing in the Last Year: No       OBJECTIVE:     Vital Signs Range (Last 24H):         Significant Labs:  Lab Results   Component Value Date    WBC 5.39 01/03/2024    HGB 16.0 01/03/2024    HCT 48.0 01/03/2024     (L) 01/03/2024    CHOL 102 11/10/2023    TRIG 139 11/10/2023    HDL 30 (L) 11/10/2023    ALT 14 01/03/2024    AST 16 01/03/2024     01/03/2024    K 4.9  01/03/2024     01/03/2024    CREATININE 0.9 01/03/2024    BUN 9 01/03/2024    CO2 30 (H) 01/03/2024    TSH 1.601 11/09/2023    INR 0.9 01/03/2024    HGBA1C 8.4 (H) 11/10/2023       Diagnostic Studies: No relevant studies.    EKG:   No results found for this or any previous visit.    2D ECHO:  TTE:  Results for orders placed or performed in visit on 01/08/24   Echo   Result Value Ref Range    BSA 2.04 m2    Cook's Biplane MOD Ejection Fraction 55 %    LVOT stroke volume 79.76 cm3    LVIDd 4.20 3.5 - 6.0 cm    LV Systolic Volume 37.00 mL    LV Systolic Volume Index 18.0 mL/m2    LVIDs 3.07 2.1 - 4.0 cm    LV Diastolic Volume 78.60 mL    LV Diastolic Volume Index 38.34 mL/m2    IVS 0.92 0.6 - 1.1 cm    LVOT diameter 2.00 cm    LVOT area 3.1 cm2    FS 27 (A) 28 - 44 %    Left Ventricle Relative Wall Thickness 0.52 cm    Posterior Wall 1.09 0.6 - 1.1 cm    LV mass 138.21 g    LV Mass Index 67 g/m2    MV Peak E Kavon 0.93 m/s    TDI LATERAL 0.11 m/s    TDI SEPTAL 0.09 m/s    E/E' ratio 9.30 m/s    MV Peak A Kavon 0.77 m/s    TR Max Kavon 1.64 m/s    E/A ratio 1.21     E wave deceleration time 261.00 msec    LV SEPTAL E/E' RATIO 10.33 m/s    LV LATERAL E/E' RATIO 8.45 m/s    LVOT peak kavon 0.98 m/s    Left Ventricular Outflow Tract Mean Velocity 0.66 cm/s    Left Ventricular Outflow Tract Mean Gradient 2.00 mmHg    RV S' 12.90 cm/s    TAPSE 2.17 cm    AV mean gradient 12 mmHg    AV peak gradient 21 mmHg    Ao peak kavon 2.31 m/s    Ao VTI 56.60 cm    LVOT peak VTI 25.40 cm    AV valve area 1.41 cm²    AV Velocity Ratio 0.42     AV index (prosthetic) 0.45     FOREST by Velocity Ratio 1.33 cm²    MV mean gradient 1 mmHg    MV peak gradient 3 mmHg    MV stenosis pressure 1/2 time 158.00 ms    MV valve area p 1/2 method 1.39 cm2    MV valve area by continuity eq 2.09 cm2    MV VTI 38.1 cm    Triscuspid Valve Regurgitation Peak Gradient 11 mmHg    PV PEAK VELOCITY 0.98 m/s    PV peak gradient 4 mmHg    Pulmonary Valve Mean  Velocity 0.66 m/s    Sinus 2.96 cm    Mean e' 0.10 m/s    ZLVIDS -1.62     ZLVIDD -3.81     TV resting pulmonary artery pressure 14 mmHg    RV TB RVSP 5 mmHg    Est. RA pres 3 mmHg    Narrative      Left Ventricle: The left ventricle is normal in size. Normal wall   thickness. Normal wall motion. There is normal systolic function with a   visually estimated ejection fraction of 60 - 65%. There is normal   diastolic function.    Right Ventricle: Normal right ventricular cavity size. Wall thickness   is normal. Right ventricle wall motion  is normal. Systolic function is   normal.    Left Atrium: Left atrium is mildly dilated.    IVC/SVC: Normal venous pressure at 3 mmHg.         DHEERAJ:  No results found for this or any previous visit.    ASSESSMENT/PLAN:           Pre-op Assessment    I have reviewed the Patient Summary Reports.     I have reviewed the Nursing Notes. I have reviewed the NPO Status.   I have reviewed the Medications.     Review of Systems  Anesthesia Hx:  No problems with previous Anesthesia   History of prior surgery of interest to airway management or planning:          Denies Family Hx of Anesthesia complications.    Denies Personal Hx of Anesthesia complications.                    Social:  No Alcohol Use, Smoker       Hematology/Oncology:  Hematology Normal                     Current/Recent Cancer.  --  Cancer in past history:                     EENT/Dental:  EENT/Dental Normal           Cardiovascular:  Exercise tolerance: good       Denies CAD.     Denies Dysrhythmias.                                    Pulmonary:   COPD      Denies Sleep Apnea.                Hepatic/GI:      Denies GERD.             Neurological:    Denies Neuromuscular Disease.                                   Endocrine:  Diabetes           Psych:  Denies Psychiatric History.                  Physical Exam  General: Well nourished, Cooperative, Alert and Oriented    Airway:  Mallampati: IV   Mouth Opening: Normal  TM  Distance: Normal  Tongue: Normal  Neck ROM: Normal ROM    Dental:  Intact    Chest/Lungs:  Clear to auscultation, Normal Respiratory Rate    Heart:  Rate: Normal  Rhythm: Regular Rhythm  Sounds: Normal    Abdomen:  Nontender, Soft, Normal        Anesthesia Plan  Type of Anesthesia, risks & benefits discussed:    Anesthesia Type: Gen ETT  Intra-op Monitoring Plan: Standard ASA Monitors and Art Line  Post Op Pain Control Plan: multimodal analgesia  Induction:  IV  Airway Plan: Fiberoptic and Video, Post-Induction  Informed Consent: Informed consent signed with the Patient and all parties understand the risks and agree with anesthesia plan.  All questions answered.   ASA Score: 3  Day of Surgery Review of History & Physical: H&P Update referred to the surgeon/provider.    Ready For Surgery From Anesthesia Perspective.     .

## 2024-01-18 ENCOUNTER — HOSPITAL ENCOUNTER (INPATIENT)
Facility: HOSPITAL | Age: 69
LOS: 4 days | Discharge: HOME | DRG: 164 | End: 2024-01-22
Attending: THORACIC SURGERY (CARDIOTHORACIC VASCULAR SURGERY) | Admitting: THORACIC SURGERY (CARDIOTHORACIC VASCULAR SURGERY)
Payer: MEDICARE

## 2024-01-18 ENCOUNTER — ANESTHESIA (OUTPATIENT)
Dept: SURGERY | Facility: HOSPITAL | Age: 69
DRG: 164 | End: 2024-01-18
Payer: MEDICARE

## 2024-01-18 DIAGNOSIS — C34.91 NSCLC OF RIGHT LUNG: Primary | ICD-10-CM

## 2024-01-18 LAB
ABO + RH BLD: NORMAL
BLD GP AB SCN CELLS X3 SERPL QL: NORMAL
CREAT SERPL-MCNC: 0.9 MG/DL (ref 0.5–1.4)
EST. GFR  (NO RACE VARIABLE): >60 ML/MIN/1.73 M^2
POCT GLUCOSE: 179 MG/DL (ref 70–110)
POCT GLUCOSE: 205 MG/DL (ref 70–110)
POCT GLUCOSE: 210 MG/DL (ref 70–110)
POCT GLUCOSE: 213 MG/DL (ref 70–110)
SPECIMEN OUTDATE: NORMAL

## 2024-01-18 PROCEDURE — 37000009 HC ANESTHESIA EA ADD 15 MINS: Performed by: THORACIC SURGERY (CARDIOTHORACIC VASCULAR SURGERY)

## 2024-01-18 PROCEDURE — 88309 TISSUE EXAM BY PATHOLOGIST: CPT | Performed by: PATHOLOGY

## 2024-01-18 PROCEDURE — 82565 ASSAY OF CREATININE: CPT | Performed by: THORACIC SURGERY (CARDIOTHORACIC VASCULAR SURGERY)

## 2024-01-18 PROCEDURE — 63600175 PHARM REV CODE 636 W HCPCS: Performed by: STUDENT IN AN ORGANIZED HEALTH CARE EDUCATION/TRAINING PROGRAM

## 2024-01-18 PROCEDURE — 36415 COLL VENOUS BLD VENIPUNCTURE: CPT | Performed by: THORACIC SURGERY (CARDIOTHORACIC VASCULAR SURGERY)

## 2024-01-18 PROCEDURE — 82962 GLUCOSE BLOOD TEST: CPT | Performed by: THORACIC SURGERY (CARDIOTHORACIC VASCULAR SURGERY)

## 2024-01-18 PROCEDURE — C9290 INJ, BUPIVACAINE LIPOSOME: HCPCS | Performed by: THORACIC SURGERY (CARDIOTHORACIC VASCULAR SURGERY)

## 2024-01-18 PROCEDURE — 94761 N-INVAS EAR/PLS OXIMETRY MLT: CPT

## 2024-01-18 PROCEDURE — 36000712 HC OR TIME LEV V 1ST 15 MIN: Performed by: THORACIC SURGERY (CARDIOTHORACIC VASCULAR SURGERY)

## 2024-01-18 PROCEDURE — 25000003 PHARM REV CODE 250: Performed by: STUDENT IN AN ORGANIZED HEALTH CARE EDUCATION/TRAINING PROGRAM

## 2024-01-18 PROCEDURE — 86901 BLOOD TYPING SEROLOGIC RH(D): CPT

## 2024-01-18 PROCEDURE — 71000033 HC RECOVERY, INTIAL HOUR: Performed by: THORACIC SURGERY (CARDIOTHORACIC VASCULAR SURGERY)

## 2024-01-18 PROCEDURE — 8E0W4CZ ROBOTIC ASSISTED PROCEDURE OF TRUNK REGION, PERCUTANEOUS ENDOSCOPIC APPROACH: ICD-10-PCS | Performed by: THORACIC SURGERY (CARDIOTHORACIC VASCULAR SURGERY)

## 2024-01-18 PROCEDURE — 0BTF4ZZ RESECTION OF RIGHT LOWER LUNG LOBE, PERCUTANEOUS ENDOSCOPIC APPROACH: ICD-10-PCS | Performed by: THORACIC SURGERY (CARDIOTHORACIC VASCULAR SURGERY)

## 2024-01-18 PROCEDURE — 36620 INSERTION CATHETER ARTERY: CPT | Mod: 59,,, | Performed by: ANESTHESIOLOGY

## 2024-01-18 PROCEDURE — 32663 THORACOSCOPY W/LOBECTOMY: CPT | Mod: AS,RT,, | Performed by: PHYSICIAN ASSISTANT

## 2024-01-18 PROCEDURE — 71000015 HC POSTOP RECOV 1ST HR: Performed by: THORACIC SURGERY (CARDIOTHORACIC VASCULAR SURGERY)

## 2024-01-18 PROCEDURE — 32663 THORACOSCOPY W/LOBECTOMY: CPT | Mod: RT,,, | Performed by: THORACIC SURGERY (CARDIOTHORACIC VASCULAR SURGERY)

## 2024-01-18 PROCEDURE — 71000039 HC RECOVERY, EACH ADD'L HOUR: Performed by: THORACIC SURGERY (CARDIOTHORACIC VASCULAR SURGERY)

## 2024-01-18 PROCEDURE — 94640 AIRWAY INHALATION TREATMENT: CPT

## 2024-01-18 PROCEDURE — 88305 TISSUE EXAM BY PATHOLOGIST: CPT | Mod: 26,,, | Performed by: PATHOLOGY

## 2024-01-18 PROCEDURE — 71000016 HC POSTOP RECOV ADDL HR: Performed by: THORACIC SURGERY (CARDIOTHORACIC VASCULAR SURGERY)

## 2024-01-18 PROCEDURE — 99900035 HC TECH TIME PER 15 MIN (STAT)

## 2024-01-18 PROCEDURE — 25000242 PHARM REV CODE 250 ALT 637 W/ HCPCS: Performed by: STUDENT IN AN ORGANIZED HEALTH CARE EDUCATION/TRAINING PROGRAM

## 2024-01-18 PROCEDURE — 36000713 HC OR TIME LEV V EA ADD 15 MIN: Performed by: THORACIC SURGERY (CARDIOTHORACIC VASCULAR SURGERY)

## 2024-01-18 PROCEDURE — 27000221 HC OXYGEN, UP TO 24 HOURS

## 2024-01-18 PROCEDURE — 27201423 OPTIME MED/SURG SUP & DEVICES STERILE SUPPLY: Performed by: THORACIC SURGERY (CARDIOTHORACIC VASCULAR SURGERY)

## 2024-01-18 PROCEDURE — D9220A PRA ANESTHESIA: Mod: ,,, | Performed by: ANESTHESIOLOGY

## 2024-01-18 PROCEDURE — 32674 THORACOSCOPY LYMPH NODE EXC: CPT | Mod: ,,, | Performed by: THORACIC SURGERY (CARDIOTHORACIC VASCULAR SURGERY)

## 2024-01-18 PROCEDURE — 07T74ZZ RESECTION OF THORAX LYMPHATIC, PERCUTANEOUS ENDOSCOPIC APPROACH: ICD-10-PCS | Performed by: THORACIC SURGERY (CARDIOTHORACIC VASCULAR SURGERY)

## 2024-01-18 PROCEDURE — 20600001 HC STEP DOWN PRIVATE ROOM

## 2024-01-18 PROCEDURE — 63600175 PHARM REV CODE 636 W HCPCS: Performed by: THORACIC SURGERY (CARDIOTHORACIC VASCULAR SURGERY)

## 2024-01-18 PROCEDURE — 88309 TISSUE EXAM BY PATHOLOGIST: CPT | Mod: 26,,, | Performed by: PATHOLOGY

## 2024-01-18 PROCEDURE — 88305 TISSUE EXAM BY PATHOLOGIST: CPT | Performed by: PATHOLOGY

## 2024-01-18 PROCEDURE — 32674 THORACOSCOPY LYMPH NODE EXC: CPT | Mod: AS,,, | Performed by: PHYSICIAN ASSISTANT

## 2024-01-18 PROCEDURE — 37000008 HC ANESTHESIA 1ST 15 MINUTES: Performed by: THORACIC SURGERY (CARDIOTHORACIC VASCULAR SURGERY)

## 2024-01-18 RX ORDER — ROCURONIUM BROMIDE 10 MG/ML
INJECTION, SOLUTION INTRAVENOUS
Status: DISCONTINUED | OUTPATIENT
Start: 2024-01-18 | End: 2024-01-18

## 2024-01-18 RX ORDER — GLUCAGON 1 MG
1 KIT INJECTION
Status: DISCONTINUED | OUTPATIENT
Start: 2024-01-18 | End: 2024-01-22 | Stop reason: HOSPADM

## 2024-01-18 RX ORDER — ONDANSETRON HYDROCHLORIDE 2 MG/ML
INJECTION, SOLUTION INTRAVENOUS
Status: DISCONTINUED | OUTPATIENT
Start: 2024-01-18 | End: 2024-01-18

## 2024-01-18 RX ORDER — PHENYLEPHRINE HCL IN 0.9% NACL 1 MG/10 ML
SYRINGE (ML) INTRAVENOUS
Status: DISCONTINUED | OUTPATIENT
Start: 2024-01-18 | End: 2024-01-18

## 2024-01-18 RX ORDER — LIDOCAINE HYDROCHLORIDE 10 MG/ML
1 INJECTION, SOLUTION EPIDURAL; INFILTRATION; INTRACAUDAL; PERINEURAL ONCE
Status: DISCONTINUED | OUTPATIENT
Start: 2024-01-18 | End: 2024-01-18

## 2024-01-18 RX ORDER — BISACODYL 10 MG/1
10 SUPPOSITORY RECTAL DAILY PRN
Status: DISCONTINUED | OUTPATIENT
Start: 2024-01-18 | End: 2024-01-22 | Stop reason: HOSPADM

## 2024-01-18 RX ORDER — LIDOCAINE 50 MG/G
1 PATCH TOPICAL
Status: DISCONTINUED | OUTPATIENT
Start: 2024-01-18 | End: 2024-01-22 | Stop reason: HOSPADM

## 2024-01-18 RX ORDER — OXYCODONE HYDROCHLORIDE 10 MG/1
10 TABLET ORAL EVERY 4 HOURS PRN
Status: DISCONTINUED | OUTPATIENT
Start: 2024-01-18 | End: 2024-01-22 | Stop reason: HOSPADM

## 2024-01-18 RX ORDER — DEXMEDETOMIDINE HYDROCHLORIDE 100 UG/ML
INJECTION, SOLUTION INTRAVENOUS
Status: DISCONTINUED | OUTPATIENT
Start: 2024-01-18 | End: 2024-01-18

## 2024-01-18 RX ORDER — POLYETHYLENE GLYCOL 3350 17 G/17G
17 POWDER, FOR SOLUTION ORAL DAILY
Status: DISCONTINUED | OUTPATIENT
Start: 2024-01-18 | End: 2024-01-18

## 2024-01-18 RX ORDER — PROPOFOL 10 MG/ML
VIAL (ML) INTRAVENOUS
Status: DISCONTINUED | OUTPATIENT
Start: 2024-01-18 | End: 2024-01-18

## 2024-01-18 RX ORDER — BUPIVACAINE HYDROCHLORIDE 2.5 MG/ML
INJECTION, SOLUTION EPIDURAL; INFILTRATION; INTRACAUDAL
Status: DISCONTINUED | OUTPATIENT
Start: 2024-01-18 | End: 2024-01-18 | Stop reason: HOSPADM

## 2024-01-18 RX ORDER — ATORVASTATIN CALCIUM 40 MG/1
40 TABLET, FILM COATED ORAL DAILY
Status: DISCONTINUED | OUTPATIENT
Start: 2024-01-18 | End: 2024-01-22 | Stop reason: HOSPADM

## 2024-01-18 RX ORDER — FENTANYL CITRATE 50 UG/ML
INJECTION, SOLUTION INTRAMUSCULAR; INTRAVENOUS
Status: DISCONTINUED | OUTPATIENT
Start: 2024-01-18 | End: 2024-01-18

## 2024-01-18 RX ORDER — ACETAMINOPHEN 500 MG
1000 TABLET ORAL EVERY 8 HOURS
Status: DISCONTINUED | OUTPATIENT
Start: 2024-01-18 | End: 2024-01-22 | Stop reason: HOSPADM

## 2024-01-18 RX ORDER — IBUPROFEN 200 MG
24 TABLET ORAL
Status: DISCONTINUED | OUTPATIENT
Start: 2024-01-18 | End: 2024-01-22 | Stop reason: HOSPADM

## 2024-01-18 RX ORDER — CEFAZOLIN 2 G/1
INJECTION, POWDER, FOR SOLUTION INTRAMUSCULAR; INTRAVENOUS
Status: DISCONTINUED | OUTPATIENT
Start: 2024-01-18 | End: 2024-01-18

## 2024-01-18 RX ORDER — ONDANSETRON 4 MG/1
4 TABLET, ORALLY DISINTEGRATING ORAL EVERY 6 HOURS PRN
Status: DISCONTINUED | OUTPATIENT
Start: 2024-01-18 | End: 2024-01-22 | Stop reason: HOSPADM

## 2024-01-18 RX ORDER — PROCHLORPERAZINE EDISYLATE 5 MG/ML
5 INJECTION INTRAMUSCULAR; INTRAVENOUS EVERY 30 MIN PRN
Status: DISCONTINUED | OUTPATIENT
Start: 2024-01-18 | End: 2024-01-18 | Stop reason: HOSPADM

## 2024-01-18 RX ORDER — GABAPENTIN 300 MG/1
300 CAPSULE ORAL 3 TIMES DAILY
Status: DISCONTINUED | OUTPATIENT
Start: 2024-01-18 | End: 2024-01-22 | Stop reason: HOSPADM

## 2024-01-18 RX ORDER — IBUPROFEN 200 MG
16 TABLET ORAL
Status: DISCONTINUED | OUTPATIENT
Start: 2024-01-18 | End: 2024-01-22 | Stop reason: HOSPADM

## 2024-01-18 RX ORDER — AMOXICILLIN 250 MG
1 CAPSULE ORAL 2 TIMES DAILY
Status: DISCONTINUED | OUTPATIENT
Start: 2024-01-18 | End: 2024-01-22 | Stop reason: HOSPADM

## 2024-01-18 RX ORDER — LIDOCAINE HYDROCHLORIDE 20 MG/ML
INJECTION, SOLUTION EPIDURAL; INFILTRATION; INTRACAUDAL; PERINEURAL
Status: DISCONTINUED | OUTPATIENT
Start: 2024-01-18 | End: 2024-01-18

## 2024-01-18 RX ORDER — INSULIN ASPART 100 [IU]/ML
0-10 INJECTION, SOLUTION INTRAVENOUS; SUBCUTANEOUS
Status: DISCONTINUED | OUTPATIENT
Start: 2024-01-18 | End: 2024-01-22 | Stop reason: HOSPADM

## 2024-01-18 RX ORDER — OXYCODONE HYDROCHLORIDE 5 MG/1
5 TABLET ORAL EVERY 4 HOURS PRN
Status: DISCONTINUED | OUTPATIENT
Start: 2024-01-18 | End: 2024-01-22 | Stop reason: HOSPADM

## 2024-01-18 RX ORDER — METHOCARBAMOL 500 MG/1
500 TABLET, FILM COATED ORAL 4 TIMES DAILY
Status: DISCONTINUED | OUTPATIENT
Start: 2024-01-18 | End: 2024-01-22 | Stop reason: HOSPADM

## 2024-01-18 RX ORDER — BUPROPION HYDROCHLORIDE 150 MG/1
150 TABLET ORAL DAILY
Status: DISCONTINUED | OUTPATIENT
Start: 2024-01-19 | End: 2024-01-22 | Stop reason: HOSPADM

## 2024-01-18 RX ORDER — DEXAMETHASONE SODIUM PHOSPHATE 4 MG/ML
INJECTION, SOLUTION INTRA-ARTICULAR; INTRALESIONAL; INTRAMUSCULAR; INTRAVENOUS; SOFT TISSUE
Status: DISCONTINUED | OUTPATIENT
Start: 2024-01-18 | End: 2024-01-18

## 2024-01-18 RX ORDER — MIDAZOLAM HYDROCHLORIDE 1 MG/ML
INJECTION, SOLUTION INTRAMUSCULAR; INTRAVENOUS
Status: DISCONTINUED | OUTPATIENT
Start: 2024-01-18 | End: 2024-01-18

## 2024-01-18 RX ORDER — ACETAMINOPHEN 500 MG
1000 TABLET ORAL
Status: DISCONTINUED | OUTPATIENT
Start: 2024-01-18 | End: 2024-01-18

## 2024-01-18 RX ORDER — POLYETHYLENE GLYCOL 3350 17 G/17G
17 POWDER, FOR SOLUTION ORAL DAILY
Status: DISCONTINUED | OUTPATIENT
Start: 2024-01-18 | End: 2024-01-22 | Stop reason: HOSPADM

## 2024-01-18 RX ORDER — ESCITALOPRAM OXALATE 20 MG/1
20 TABLET ORAL NIGHTLY
Status: DISCONTINUED | OUTPATIENT
Start: 2024-01-18 | End: 2024-01-22 | Stop reason: HOSPADM

## 2024-01-18 RX ORDER — HYDROMORPHONE HYDROCHLORIDE 1 MG/ML
0.2 INJECTION, SOLUTION INTRAMUSCULAR; INTRAVENOUS; SUBCUTANEOUS EVERY 5 MIN PRN
Status: DISCONTINUED | OUTPATIENT
Start: 2024-01-18 | End: 2024-01-18 | Stop reason: HOSPADM

## 2024-01-18 RX ORDER — ENOXAPARIN SODIUM 100 MG/ML
40 INJECTION SUBCUTANEOUS EVERY 24 HOURS
Status: DISCONTINUED | OUTPATIENT
Start: 2024-01-19 | End: 2024-01-22 | Stop reason: HOSPADM

## 2024-01-18 RX ORDER — IPRATROPIUM BROMIDE AND ALBUTEROL SULFATE 2.5; .5 MG/3ML; MG/3ML
3 SOLUTION RESPIRATORY (INHALATION) EVERY 6 HOURS
Status: DISCONTINUED | OUTPATIENT
Start: 2024-01-18 | End: 2024-01-22 | Stop reason: HOSPADM

## 2024-01-18 RX ORDER — SODIUM CHLORIDE 0.9 % (FLUSH) 0.9 %
10 SYRINGE (ML) INJECTION
Status: DISCONTINUED | OUTPATIENT
Start: 2024-01-18 | End: 2024-01-22 | Stop reason: HOSPADM

## 2024-01-18 RX ADMIN — OXYCODONE HYDROCHLORIDE 10 MG: 10 TABLET ORAL at 12:01

## 2024-01-18 RX ADMIN — Medication 200 MCG: at 08:01

## 2024-01-18 RX ADMIN — INSULIN ASPART 2 UNITS: 100 INJECTION, SOLUTION INTRAVENOUS; SUBCUTANEOUS at 10:01

## 2024-01-18 RX ADMIN — METHOCARBAMOL 500 MG: 500 TABLET ORAL at 12:01

## 2024-01-18 RX ADMIN — SODIUM CHLORIDE 0.5 MCG/KG/MIN: 9 INJECTION, SOLUTION INTRAVENOUS at 08:01

## 2024-01-18 RX ADMIN — PROCHLORPERAZINE EDISYLATE 5 MG: 5 INJECTION INTRAMUSCULAR; INTRAVENOUS at 12:01

## 2024-01-18 RX ADMIN — GABAPENTIN 300 MG: 300 CAPSULE ORAL at 09:01

## 2024-01-18 RX ADMIN — Medication 100 MCG: at 12:01

## 2024-01-18 RX ADMIN — SENNOSIDES AND DOCUSATE SODIUM 1 TABLET: 8.6; 5 TABLET ORAL at 12:01

## 2024-01-18 RX ADMIN — POLYETHYLENE GLYCOL 3350 17 G: 17 POWDER, FOR SOLUTION ORAL at 12:01

## 2024-01-18 RX ADMIN — ROCURONIUM BROMIDE 50 MG: 10 INJECTION INTRAVENOUS at 08:01

## 2024-01-18 RX ADMIN — IPRATROPIUM BROMIDE AND ALBUTEROL SULFATE 3 ML: .5; 3 SOLUTION RESPIRATORY (INHALATION) at 02:01

## 2024-01-18 RX ADMIN — GABAPENTIN 300 MG: 300 CAPSULE ORAL at 03:01

## 2024-01-18 RX ADMIN — LIDOCAINE 1 PATCH: 700 PATCH TOPICAL at 02:01

## 2024-01-18 RX ADMIN — FENTANYL CITRATE 100 MCG: 50 INJECTION INTRAMUSCULAR; INTRAVENOUS at 07:01

## 2024-01-18 RX ADMIN — Medication 200 MCG: at 09:01

## 2024-01-18 RX ADMIN — CEFAZOLIN 2 G: 2 INJECTION, POWDER, FOR SOLUTION INTRAMUSCULAR; INTRAVENOUS at 05:01

## 2024-01-18 RX ADMIN — Medication 200 MCG: at 07:01

## 2024-01-18 RX ADMIN — CEFAZOLIN 2 G: 2 INJECTION, POWDER, FOR SOLUTION INTRAMUSCULAR; INTRAVENOUS at 11:01

## 2024-01-18 RX ADMIN — FENTANYL CITRATE 50 MCG: 50 INJECTION INTRAMUSCULAR; INTRAVENOUS at 09:01

## 2024-01-18 RX ADMIN — ACETAMINOPHEN 1000 MG: 500 TABLET ORAL at 09:01

## 2024-01-18 RX ADMIN — GLYCOPYRROLATE 0.2 MG: 0.2 INJECTION INTRAMUSCULAR; INTRAVENOUS at 07:01

## 2024-01-18 RX ADMIN — FENTANYL CITRATE 50 MCG: 50 INJECTION INTRAMUSCULAR; INTRAVENOUS at 08:01

## 2024-01-18 RX ADMIN — ACETAMINOPHEN 1000 MG: 500 TABLET ORAL at 03:01

## 2024-01-18 RX ADMIN — DEXMEDETOMIDINE 8 MCG: 200 INJECTION, SOLUTION INTRAVENOUS at 10:01

## 2024-01-18 RX ADMIN — MIDAZOLAM 2 MG: 1 INJECTION INTRAMUSCULAR; INTRAVENOUS at 07:01

## 2024-01-18 RX ADMIN — Medication 100 MCG: at 11:01

## 2024-01-18 RX ADMIN — DEXAMETHASONE SODIUM PHOSPHATE 4 MG: 4 INJECTION INTRA-ARTICULAR; INTRALESIONAL; INTRAMUSCULAR; INTRAVENOUS; SOFT TISSUE at 08:01

## 2024-01-18 RX ADMIN — INSULIN ASPART 2 UNITS: 100 INJECTION, SOLUTION INTRAVENOUS; SUBCUTANEOUS at 07:01

## 2024-01-18 RX ADMIN — HYDROMORPHONE HYDROCHLORIDE 0.2 MG: 1 INJECTION, SOLUTION INTRAMUSCULAR; INTRAVENOUS; SUBCUTANEOUS at 12:01

## 2024-01-18 RX ADMIN — DEXMEDETOMIDINE 8 MCG: 200 INJECTION, SOLUTION INTRAVENOUS at 11:01

## 2024-01-18 RX ADMIN — METHOCARBAMOL 500 MG: 500 TABLET ORAL at 09:01

## 2024-01-18 RX ADMIN — DEXMEDETOMIDINE 12 MCG: 200 INJECTION, SOLUTION INTRAVENOUS at 09:01

## 2024-01-18 RX ADMIN — PROPOFOL 200 MG: 10 INJECTION, EMULSION INTRAVENOUS at 07:01

## 2024-01-18 RX ADMIN — ROCURONIUM BROMIDE 100 MG: 10 INJECTION INTRAVENOUS at 07:01

## 2024-01-18 RX ADMIN — METHOCARBAMOL 500 MG: 500 TABLET ORAL at 05:01

## 2024-01-18 RX ADMIN — ROCURONIUM BROMIDE 50 MG: 10 INJECTION INTRAVENOUS at 10:01

## 2024-01-18 RX ADMIN — CEFAZOLIN 2 EACH: 330 INJECTION, POWDER, FOR SOLUTION INTRAMUSCULAR; INTRAVENOUS at 08:01

## 2024-01-18 RX ADMIN — IPRATROPIUM BROMIDE AND ALBUTEROL SULFATE 3 ML: .5; 3 SOLUTION RESPIRATORY (INHALATION) at 07:01

## 2024-01-18 RX ADMIN — SENNOSIDES AND DOCUSATE SODIUM 1 TABLET: 8.6; 5 TABLET ORAL at 09:01

## 2024-01-18 RX ADMIN — LIDOCAINE HYDROCHLORIDE 100 MG: 20 INJECTION, SOLUTION EPIDURAL; INFILTRATION; INTRACAUDAL at 07:01

## 2024-01-18 RX ADMIN — ONDANSETRON 4 MG: 2 INJECTION INTRAMUSCULAR; INTRAVENOUS at 11:01

## 2024-01-18 RX ADMIN — ESCITALOPRAM OXALATE 20 MG: 20 TABLET ORAL at 09:01

## 2024-01-18 RX ADMIN — DEXMEDETOMIDINE 12 MCG: 200 INJECTION, SOLUTION INTRAVENOUS at 08:01

## 2024-01-18 RX ADMIN — Medication 100 MCG: at 09:01

## 2024-01-18 NOTE — TRANSFER OF CARE
Anesthesia Transfer of Care Note    Patient: Malik Gardiner    Procedure(s) Performed: Procedure(s) (LRB):  XI ROBOTIC RIGHT LOWER LOBECTOMY,LUNG (Right)  LYMPHADENECTOMY (Right)  BLOCK, NERVE, INTERCOSTAL, 2 OR MORE    Patient location: PACU    Anesthesia Type: general    Transport from OR: Transported from OR on 6-10 L/min O2 by face mask with adequate spontaneous ventilation    Post pain: adequate analgesia    Post assessment: no apparent anesthetic complications    Post vital signs: stable    Level of consciousness: awake    Complications: none    Transfer of care protocol was followed      Last vitals: Visit Vitals  /61   Pulse 66   Temp 36.7 °C (98.1 °F)   Resp 20   SpO2 100%

## 2024-01-18 NOTE — OP NOTE
Date of Surgery:  1/18/2024  Preoperative Diagnosis:  Right lower lobe squamous cell cancer  Postoperative Diagnosis:  Same  Procedure:  Robotic right lower lobectomy, mediastinal lymph node dissection, intercostal nerve block 2 or more levels  Surgeon: Renny Rowe MD  First Assistant: Renny Rivera DO  Bedside Assistant:  Luzma Solomon PA-C  Anesthesia: GETA, 7 level intercostal Exparel/Marcaine/saline  EBL: <10mL    Surgery in Detail:    The patient has a biopsy-proven right lower lobe squamous cell cancer that appears to be localized.  Right lower lobectomy is indicated.      The patient was taken to the operating room placed supine and identified.  General anesthesia was established with double-lumen tube placement.  Tube positioning was confirmed fiberoptically.  The patient was positioned in a left lateral decubitus position, all pressure points were padded.  The right lung was isolated and the right chest was prepped and draped.  A robotic time-out was performed.  Robotic ports were placed in the 8th interspace between the anterior axillary line and the paravertebral gutter.  A 12 mm assistant port was placed in the 10th interspace posterior axillary line.  Carbon dioxide was insufflated and a 7 level intercostal nerve block was performed.  The robot was docked.  The inferior pulmonary ligament was divided.  The posterior hilar pleura was incised and right level 8 nodes harvested.  The posterior aspect of the inferior pulmonary vein was mobilized.  The subcarinal space was opened and a large level 7 merle packet was harvested.  The confluence of the right upper lobe and bronchus intermedius bronchi was mobilized and a posterior level 11 sump node harvested exposing the underlying interlobar pulmonary artery.  The paratracheal pleura was opened and the level 2 and 4 merle packet was harvested.  The confluence of the horizontal and oblique fissures was then explored exposing the interlobar pulmonary  artery.  The fissure was incomplete and the dissection in this area was tedious.  An anterior level 11 lymph node was harvested and the anterior aspect of the oblique fissure completed using a robotic blue load.  Posterior aspect of the fissure was mobilized and divided with a robotic blue load.  The pulmonary artery was mobilized away from the underlying bronchus and divided using a robotic white load, dividing the basilar and superior segmental trunks together.  The inferior pulmonary vein was then mobilized and divided after ensuring that the middle lobe vein was separate and emptied into the superior pulmonary vein.  Peribronchial soft tissue was then mobilized and the right lower lobe bronchus divided using a robotic black load after performing a clamp test.  The specimen was placed into a large endobag and extracted through an enlarged anterior robotic port.  The operative field was hemostatic.  Hemostatic gauze was placed along the mediastinal dissection bed.  All robotic instrumentation and ports were removed.  A 24 Indonesian Fer drain was inserted and secured with silk suture.  All port sites were hemostatic.  Right lung ventilation was restored with complete lung expansion.  All port sites were closed in 2 layers using absorbable suture.  A sterile dressing was applied.  The patient was extubated transported to the PACU in stable condition.     Bedside assistant attestation: Luzma Solomon PA-C  functioned as a bedside 1st assistant.  Her duties included robotic docking, instrument exchange, and specimen retrieval throughout the entire surgery.  There was no qualified resident available to function as a bedside first assistant given the case complexity and extent of duties described above.     Attending attestation: I was present for and either directly assisted with or performed the critical and key portions of the procedure.    Thoracic (Pulmonary) Cancer - Synoptic Operative Report Summary    Side of  surgery Right   Surgical approach Robotic   Tumor type NSCLC - Squamous   Which lymph nodes were submitted as separate specimens? 2R - Upper Paratracheal (right), 4R - Lower Paratracheal (right), 7 - Subcarinal, 8R - Paraesophageal (right), and 11R - Interlobar (right)   What pre-operative therapies did the patient receive? None

## 2024-01-18 NOTE — ANESTHESIA POSTPROCEDURE EVALUATION
Anesthesia Post Evaluation    Patient: Malik Gardiner    Procedure(s) Performed: Procedure(s) (LRB):  XI ROBOTIC RIGHT LOWER LOBECTOMY,LUNG (Right)  LYMPHADENECTOMY (Right)  BLOCK, NERVE, INTERCOSTAL, 2 OR MORE    Final Anesthesia Type: general      Level of consciousness: awake and alert  Post-procedure vital signs: reviewed and stable  Pain control: Pain has been treated.  Airway patency: patent    PONV status: Absent or treated.  Anesthetic complications: no      Cardiovascular status: hemodynamically stable  Respiratory status: unassisted  Hydration status: euvolemic                Vitals Value Taken Time   BP 84/45 01/18/24 1331   Temp 36.6 °C (97.9 °F) 01/18/24 1209   Pulse 59 01/18/24 1345   Resp 14 01/18/24 1345   SpO2 96 % 01/18/24 1345   Vitals shown include unvalidated device data.      No case tracking events are documented in the log.      Pain/Roxana Score: Pain Rating Prior to Med Admin: 7 (1/18/2024 12:29 PM)  Roxana Score: 8 (1/18/2024  1:00 PM)

## 2024-01-18 NOTE — BRIEF OP NOTE
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 MLND levels 2, 4, 7, 8, 11.  24F linsey in paravertebral gutter.    Estimated Blood Loss: 20 cc           Specimens:   Specimen (24h ago, onward)       Start     Ordered    01/18/24 1059  Specimen to Pathology, Surgery Pulmonary and Thoracic  Once        Comments: Pre-op Diagnosis: Squamous cell carcinoma of lower lobe of right lung [C34.31]Procedure(s):XI ROBOTIC RIGHT LOWER LOBECTOMY,LUNGLYMPHADENECTOMY Number of specimens: 8Name of specimens: 1. Right level 8, permanent2. Right level 8 #2, permanent3. Level 7 merle packet multiple present, permanent4. Right posterior level 11 sump node, permanent5. Right level 4 merle packet multiple present, permanent6. Right level 2 merle packet multiple present, permanent7. Anterior level 11 node, permanent8. Right lower lobe, permanent     References:    Click here for ordering Quick Tip   Question Answer Comment   Procedure Type: Pulmonary and Thoracic    Specimen Class: Known or suspected malignancy    Which provider would you like to cc? LETTY ROWE    Which provider would you like to cc? LETTY HYMAN    Release to patient Immediate        01/18/24 1113                    [id number]

## 2024-01-18 NOTE — ANESTHESIA PROCEDURE NOTES
Arterial    Diagnosis: lung cancer    Patient location during procedure: done in OR    Staffing  Authorizing Provider: Kristian Cid MD  Performing Provider: Gayatri Augustine MD    Staffing  Performed by: Gayatri Augustine MD  Authorized by: Kristian Cid MD    Anesthesiologist was present at the time of the procedure.    Preanesthetic Checklist  Completed: patient identified, IV checked, site marked, risks and benefits discussed, surgical consent, monitors and equipment checked, pre-op evaluation, timeout performed and anesthesia consent givenArterial  Skin Prep: chlorhexidine gluconate  Orientation: right  Location: radial    Catheter Size: 20 G  Catheter placement by Anatomical landmarks. Heme positive aspiration all ports. Insertion Attempts: 1  Assessment  Dressing: secured with tape and tegaderm  Patient: Tolerated well

## 2024-01-18 NOTE — ANESTHESIA PROCEDURE NOTES
Intubation    Date/Time: 1/18/2024 7:14 AM    Performed by: Gayatri Augustine MD  Authorized by: Kristian Cid MD    Intubation:     Induction:  Intravenous    Intubated:  Postinduction    Mask Ventilation:  Moderately difficult with oral airway    Attempts:  1    Attempted By:  Resident anesthesiologist    Method of Intubation:  Video laryngoscopy    Blade:  Kat 3    Laryngeal View Grade: Grade I - full view of cords      Difficult Airway Encountered?: No      Complications:  None    Airway Device:  Double lumen tube left    Airway Device Size:  39F    Style/Cuff Inflation:  Cuffed    Secured at:  The lips    Placement Verified By:  Capnometry    Complicating Factors:  None    Findings Post-Intubation:  BS equal bilateral and atraumatic/condition of teeth unchanged

## 2024-01-19 PROBLEM — E11.9 TYPE 2 DIABETES MELLITUS, WITH LONG-TERM CURRENT USE OF INSULIN: Status: ACTIVE | Noted: 2024-01-19

## 2024-01-19 PROBLEM — Z79.4 TYPE 2 DIABETES MELLITUS, WITH LONG-TERM CURRENT USE OF INSULIN: Status: ACTIVE | Noted: 2024-01-19

## 2024-01-19 LAB
ANION GAP SERPL CALC-SCNC: 8 MMOL/L (ref 8–16)
BUN SERPL-MCNC: 16 MG/DL (ref 8–23)
CALCIUM SERPL-MCNC: 8.4 MG/DL (ref 8.7–10.5)
CHLORIDE SERPL-SCNC: 102 MMOL/L (ref 95–110)
CO2 SERPL-SCNC: 26 MMOL/L (ref 23–29)
CREAT SERPL-MCNC: 0.9 MG/DL (ref 0.5–1.4)
EST. GFR  (NO RACE VARIABLE): >60 ML/MIN/1.73 M^2
GLUCOSE SERPL-MCNC: 257 MG/DL (ref 70–110)
POCT GLUCOSE: 261 MG/DL (ref 70–110)
POCT GLUCOSE: 277 MG/DL (ref 70–110)
POCT GLUCOSE: 280 MG/DL (ref 70–110)
POTASSIUM SERPL-SCNC: 4.2 MMOL/L (ref 3.5–5.1)
SODIUM SERPL-SCNC: 136 MMOL/L (ref 136–145)

## 2024-01-19 PROCEDURE — 80048 BASIC METABOLIC PNL TOTAL CA: CPT | Performed by: STUDENT IN AN ORGANIZED HEALTH CARE EDUCATION/TRAINING PROGRAM

## 2024-01-19 PROCEDURE — 94761 N-INVAS EAR/PLS OXIMETRY MLT: CPT

## 2024-01-19 PROCEDURE — 20600001 HC STEP DOWN PRIVATE ROOM

## 2024-01-19 PROCEDURE — 25000003 PHARM REV CODE 250: Performed by: STUDENT IN AN ORGANIZED HEALTH CARE EDUCATION/TRAINING PROGRAM

## 2024-01-19 PROCEDURE — 36415 COLL VENOUS BLD VENIPUNCTURE: CPT | Performed by: STUDENT IN AN ORGANIZED HEALTH CARE EDUCATION/TRAINING PROGRAM

## 2024-01-19 PROCEDURE — 63600175 PHARM REV CODE 636 W HCPCS: Performed by: STUDENT IN AN ORGANIZED HEALTH CARE EDUCATION/TRAINING PROGRAM

## 2024-01-19 PROCEDURE — 25000242 PHARM REV CODE 250 ALT 637 W/ HCPCS: Performed by: STUDENT IN AN ORGANIZED HEALTH CARE EDUCATION/TRAINING PROGRAM

## 2024-01-19 PROCEDURE — 94640 AIRWAY INHALATION TREATMENT: CPT

## 2024-01-19 PROCEDURE — 27000221 HC OXYGEN, UP TO 24 HOURS

## 2024-01-19 PROCEDURE — 99900035 HC TECH TIME PER 15 MIN (STAT)

## 2024-01-19 RX ORDER — PENTOXIFYLLINE 400 MG/1
400 TABLET, EXTENDED RELEASE ORAL
Status: DISCONTINUED | OUTPATIENT
Start: 2024-01-19 | End: 2024-01-22 | Stop reason: HOSPADM

## 2024-01-19 RX ORDER — ASPIRIN 81 MG/1
81 TABLET ORAL EVERY MORNING
Status: DISCONTINUED | OUTPATIENT
Start: 2024-01-19 | End: 2024-01-22 | Stop reason: HOSPADM

## 2024-01-19 RX ORDER — FUROSEMIDE 10 MG/ML
20 INJECTION INTRAMUSCULAR; INTRAVENOUS ONCE
Status: COMPLETED | OUTPATIENT
Start: 2024-01-19 | End: 2024-01-19

## 2024-01-19 RX ADMIN — GABAPENTIN 300 MG: 300 CAPSULE ORAL at 09:01

## 2024-01-19 RX ADMIN — METHOCARBAMOL 500 MG: 500 TABLET ORAL at 12:01

## 2024-01-19 RX ADMIN — SENNOSIDES AND DOCUSATE SODIUM 1 TABLET: 8.6; 5 TABLET ORAL at 09:01

## 2024-01-19 RX ADMIN — ACETAMINOPHEN 1000 MG: 500 TABLET ORAL at 02:01

## 2024-01-19 RX ADMIN — OXYCODONE HYDROCHLORIDE 10 MG: 10 TABLET ORAL at 06:01

## 2024-01-19 RX ADMIN — SENNOSIDES AND DOCUSATE SODIUM 1 TABLET: 8.6; 5 TABLET ORAL at 08:01

## 2024-01-19 RX ADMIN — IPRATROPIUM BROMIDE AND ALBUTEROL SULFATE 3 ML: .5; 3 SOLUTION RESPIRATORY (INHALATION) at 02:01

## 2024-01-19 RX ADMIN — INSULIN ASPART 6 UNITS: 100 INJECTION, SOLUTION INTRAVENOUS; SUBCUTANEOUS at 05:01

## 2024-01-19 RX ADMIN — IPRATROPIUM BROMIDE AND ALBUTEROL SULFATE 3 ML: .5; 3 SOLUTION RESPIRATORY (INHALATION) at 07:01

## 2024-01-19 RX ADMIN — ESCITALOPRAM OXALATE 20 MG: 20 TABLET ORAL at 09:01

## 2024-01-19 RX ADMIN — ENOXAPARIN SODIUM 40 MG: 40 INJECTION SUBCUTANEOUS at 05:01

## 2024-01-19 RX ADMIN — IPRATROPIUM BROMIDE AND ALBUTEROL SULFATE 3 ML: .5; 3 SOLUTION RESPIRATORY (INHALATION) at 09:01

## 2024-01-19 RX ADMIN — METHOCARBAMOL 500 MG: 500 TABLET ORAL at 08:01

## 2024-01-19 RX ADMIN — BUPROPION HYDROCHLORIDE 150 MG: 150 TABLET, FILM COATED, EXTENDED RELEASE ORAL at 08:01

## 2024-01-19 RX ADMIN — INSULIN ASPART 6 UNITS: 100 INJECTION, SOLUTION INTRAVENOUS; SUBCUTANEOUS at 12:01

## 2024-01-19 RX ADMIN — INSULIN ASPART 3 UNITS: 100 INJECTION, SOLUTION INTRAVENOUS; SUBCUTANEOUS at 09:01

## 2024-01-19 RX ADMIN — PENTOXIFYLLINE 400 MG: 400 TABLET, EXTENDED RELEASE ORAL at 05:01

## 2024-01-19 RX ADMIN — POLYETHYLENE GLYCOL 3350 17 G: 17 POWDER, FOR SOLUTION ORAL at 08:01

## 2024-01-19 RX ADMIN — PENTOXIFYLLINE 400 MG: 400 TABLET, EXTENDED RELEASE ORAL at 12:01

## 2024-01-19 RX ADMIN — INSULIN DETEMIR 15 UNITS: 100 INJECTION, SOLUTION SUBCUTANEOUS at 09:01

## 2024-01-19 RX ADMIN — OXYCODONE HYDROCHLORIDE 10 MG: 10 TABLET ORAL at 10:01

## 2024-01-19 RX ADMIN — ATORVASTATIN CALCIUM 40 MG: 40 TABLET, FILM COATED ORAL at 08:01

## 2024-01-19 RX ADMIN — INSULIN ASPART 6 UNITS: 100 INJECTION, SOLUTION INTRAVENOUS; SUBCUTANEOUS at 08:01

## 2024-01-19 RX ADMIN — ACETAMINOPHEN 1000 MG: 500 TABLET ORAL at 09:01

## 2024-01-19 RX ADMIN — ACETAMINOPHEN 1000 MG: 500 TABLET ORAL at 05:01

## 2024-01-19 RX ADMIN — ASPIRIN 81 MG: 81 TABLET, COATED ORAL at 10:01

## 2024-01-19 RX ADMIN — OXYCODONE HYDROCHLORIDE 10 MG: 10 TABLET ORAL at 02:01

## 2024-01-19 RX ADMIN — METHOCARBAMOL 500 MG: 500 TABLET ORAL at 09:01

## 2024-01-19 RX ADMIN — GABAPENTIN 300 MG: 300 CAPSULE ORAL at 02:01

## 2024-01-19 RX ADMIN — IPRATROPIUM BROMIDE AND ALBUTEROL SULFATE 3 ML: .5; 3 SOLUTION RESPIRATORY (INHALATION) at 01:01

## 2024-01-19 RX ADMIN — METHOCARBAMOL 500 MG: 500 TABLET ORAL at 05:01

## 2024-01-19 RX ADMIN — FUROSEMIDE 20 MG: 10 INJECTION, SOLUTION INTRAMUSCULAR; INTRAVENOUS at 10:01

## 2024-01-19 RX ADMIN — LIDOCAINE 1 PATCH: 700 PATCH TOPICAL at 12:01

## 2024-01-19 RX ADMIN — GABAPENTIN 300 MG: 300 CAPSULE ORAL at 08:01

## 2024-01-19 NOTE — SUBJECTIVE & OBJECTIVE
Interval History: POD1 AFVSS. Chest tube 450 ml serosanguinous output. Placed to [facility] on AM rounds. Expiratory air leak with cough and IS use.     Medications:  Continuous Infusions:  Scheduled Meds:   acetaminophen  1,000 mg Oral Q8H    albuterol-ipratropium  3 mL Nebulization Q6H    atorvastatin  40 mg Oral Daily    buPROPion  150 mg Oral Daily    enoxparin  40 mg Subcutaneous Q24H (prophylaxis, 1700)    EScitalopram oxalate  20 mg Oral QHS    gabapentin  300 mg Oral TID    LIDOcaine  1 patch Transdermal Q24H    methocarbamoL  500 mg Oral QID    polyethylene glycol  17 g Oral Daily    senna-docusate 8.6-50 mg  1 tablet Oral BID     PRN Meds:bisacodyL, dextrose 10%, dextrose 10%, glucagon (human recombinant), glucose, glucose, insulin aspart U-100, ondansetron, oxyCODONE, oxyCODONE, sodium chloride 0.9%     Review of patient's allergies indicates:  No Known Allergies  Objective:     Vital Signs (Most Recent):  Temp: 98.5 °F (36.9 °C) (01/19/24 0733)  Pulse: 63 (01/19/24 0733)  Resp: 18 (01/19/24 0733)  BP: (!) 117/59 (01/19/24 0733)  SpO2: 97 % (01/19/24 0733) Vital Signs (24h Range):  Temp:  [97.4 °F (36.3 °C)-98.5 °F (36.9 °C)] 98.5 °F (36.9 °C)  Pulse:  [55-83] 63  Resp:  [12-20] 18  SpO2:  [91 %-100 %] 97 %  BP: ()/(46-61) 117/59     Intake/Output - Last 3 Shifts         01/17 0700 01/18 0659 01/18 0700 01/19 0659 01/19 0700 01/20 0659    P.O.  600     Total Intake(mL/kg)  600 (7.2)     Urine (mL/kg/hr)  1100 (0.6)     Chest Tube  450     Total Output  1550     Net  -950                    SpO2: 97 %        Physical Exam  Constitutional:       Appearance: Normal appearance. He is normal weight.   Eyes:      Extraocular Movements: Extraocular movements intact.   Cardiovascular:      Rate and Rhythm: Normal rate and regular rhythm.      Pulses: Normal pulses.   Pulmonary:      Effort: Pulmonary effort is normal.      Breath sounds: Normal breath sounds.      Comments: Chest tube in place on right  side. Expiratory air leak.   Abdominal:      General: Abdomen is flat.      Palpations: Abdomen is soft.   Skin:     General: Skin is warm and dry.      Comments: Incisions c/d/i   Neurological:      General: No focal deficit present.      Mental Status: He is alert and oriented to person, place, and time. Mental status is at baseline.   Psychiatric:         Mood and Affect: Mood normal.         Behavior: Behavior normal.         Thought Content: Thought content normal.            Significant Labs:  All pertinent labs from the last 24 hours have been reviewed.    Significant Diagnostics:  CXR: I have reviewed all pertinent results/findings within the past 24 hours    VTE Risk Mitigation (From admission, onward)           Ordered     enoxaparin injection 40 mg  Every 24 hours         01/18/24 1652     Place DARLINE hose  Until discontinued         01/18/24 1208     Place sequential compression device  Until discontinued         01/18/24 1208

## 2024-01-19 NOTE — CARE UPDATE
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      Devyn Kay PA-C  Unit Based [facility]

## 2024-01-19 NOTE — PROGRESS NOTES
Uriel Sanchez - [facility]  Thoracic Surgery  Progress Note    Subjective:     History of Present Illness:  Patient is a 68 y.o. male smoker with DM on insulin, COPD, melanoma and PAD (angioplasty w/o stent) here today for RLL squamous cell carcinoma. Admitted to [facility] with food poisoning and during work-up underwent CT abdomen which identified lung nodule measuring 1.2 cm previously 0.5cm on CT Jan 2023. Referred to pulmonary. Repeat CT 12/1/23 with interval progression to 1.5 cm RLL nodule. No other nodules or adenopathy. Bronchoscopy 12/11/23: RLL squamous cell carcinoma. 4L and 7 negative for malignancy. PET with RLL nodule SUV 6.1, no mediastinal or hilar avidity. Chronic cough with phlegm. Claudication ~ 50 yds. Occasional resting leg pain. Not on blood thinners. On pentoxifylline.      Smoker. 2-3ppd smoker however down to 1.5 ppd.   PSH: jaw reconstruction with plates - tracheostomy with decannulation within month of placement, excision of melanoma R shoulder, appendectomy, laryngeal vs VC mass biopsy       Good neck extension.   No issues with placement of 9-0 ETT. Good view of cords.     Post-Op Info:  Procedure(s) (LRB):  XI ROBOTIC RIGHT LOWER LOBECTOMY,LUNG (Right)  LYMPHADENECTOMY (Right)  BLOCK, NERVE, INTERCOSTAL, 2 OR MORE   1 Day Post-Op     Interval History: POD1 AFVSS. Chest tube 450 ml serosanguinous output. Placed to waterseal on AM rounds. Expiratory air leak with cough and IS use.     Medications:  Continuous Infusions:  Scheduled Meds:   acetaminophen  1,000 mg Oral Q8H    albuterol-ipratropium  3 mL Nebulization Q6H    atorvastatin  40 mg Oral Daily    buPROPion  150 mg Oral Daily    enoxparin  40 mg Subcutaneous Q24H (prophylaxis, 1700)    EScitalopram oxalate  20 mg Oral QHS    gabapentin  300 mg Oral TID    LIDOcaine  1 patch Transdermal Q24H    methocarbamoL  500 mg Oral QID    polyethylene glycol  17 g Oral Daily    senna-docusate 8.6-50 mg  1 tablet Oral BID     PRN Meds:bisacodyL, dextrose 10%,  dextrose 10%, glucagon (human recombinant), glucose, glucose, insulin aspart U-100, ondansetron, oxyCODONE, oxyCODONE, sodium chloride 0.9%     Review of patient's allergies indicates:  No Known Allergies  Objective:     Vital Signs (Most Recent):  Temp: 98.5 °F (36.9 °C) (01/19/24 0733)  Pulse: 63 (01/19/24 0733)  Resp: 18 (01/19/24 0733)  BP: (!) 117/59 (01/19/24 0733)  SpO2: 97 % (01/19/24 0733) Vital Signs (24h Range):  Temp:  [97.4 °F (36.3 °C)-98.5 °F (36.9 °C)] 98.5 °F (36.9 °C)  Pulse:  [55-83] 63  Resp:  [12-20] 18  SpO2:  [91 %-100 %] 97 %  BP: ()/(46-61) 117/59     Intake/Output - Last 3 Shifts         01/17 0700  01/18 0659 01/18 0700  01/19 0659 01/19 0700  01/20 0659    P.O.  600     Total Intake(mL/kg)  600 (7.2)     Urine (mL/kg/hr)  1100 (0.6)     Chest Tube  450     Total Output  1550     Net  -950                    SpO2: 97 %        Physical Exam  Constitutional:       Appearance: Normal appearance. He is normal weight.   Eyes:      Extraocular Movements: Extraocular movements intact.   Cardiovascular:      Rate and Rhythm: Normal rate and regular rhythm.      Pulses: Normal pulses.   Pulmonary:      Effort: Pulmonary effort is normal.      Breath sounds: Normal breath sounds.      Comments: Chest tube in place on right side. Expiratory air leak.   Abdominal:      General: Abdomen is flat.      Palpations: Abdomen is soft.   Skin:     General: Skin is warm and dry.      Comments: Incisions c/d/i   Neurological:      General: No focal deficit present.      Mental Status: He is alert and oriented to person, place, and time. Mental status is at baseline.   Psychiatric:         Mood and Affect: Mood normal.         Behavior: Behavior normal.         Thought Content: Thought content normal.            Significant Labs:  All pertinent labs from the last 24 hours have been reviewed.    Significant Diagnostics:  CXR: I have reviewed all pertinent results/findings within the past 24 hours    VTE  Risk Mitigation (From admission, onward)           Ordered     enoxaparin injection 40 mg  Every 24 hours         01/18/24 1652     Place DARLINE hose  Until discontinued         01/18/24 1208     Place sequential compression device  Until discontinued         01/18/24 1208                  Assessment/Plan:     * Squamous cell carcinoma of lower lobe of right lung  POD1 s/p robotic right lower lobectomy with MLND.    - CT to waterseal.   - Initiate diuresis with lasix  - Wean O2; goal O2 saturation >= 88%  - Multimodal pain management  - Daily CXR  - DVT ppx  - OOB, ambulate as tolerated  - Diabetic diet         PAUL ArredondoC  Thoracic Surgery  [facility]

## 2024-01-19 NOTE — ASSESSMENT & PLAN NOTE
POD1 s/p robotic right lower lobectomy with MLND.    - CT to waterseal.   - Initiate diuresis with lasix  - Wean O2; goal O2 saturation >= 88%  - Multimodal pain management  - Daily CXR  - DVT ppx  - OOB, ambulate as tolerated  - Diabetic diet

## 2024-01-19 NOTE — PLAN OF CARE
[facility] Plan of Care Note  Dx: R lobectomy    Shift Events: Chest tube to water seal, chest tube insertion site dressing change x3, pain medication, patient ambulated halls 3x, incentive spirometer usage through out the day     Goals of Care: Safety, comfort    Neuro: AAO    Vital Signs: WNL    Respiratory: 2L    Diet: Clears    Is patient tolerating current diet? Yes    GTTS: None    Urine Output/Bowel Movement: Per charting    Drains/Tubes/Tube Feeds (include total output/shift): Per charting    Lines: PIV    Accuchecks:ACHS    Skin: R chest tube, lap sites    Fall Risk Score: Per charting    Activity level? Up with assist    Any scheduled procedures? None    Any safety concerns? Fall risk (chest tube, IV)    Other: None

## 2024-01-19 NOTE — HPI
Patient is a 68 y.o. male smoker with DM on insulin, COPD, melanoma and PAD (angioplasty w/o stent) here today for RLL squamous cell carcinoma. Admitted to [facility] with food poisoning and during work-up underwent CT abdomen which identified lung nodule measuring 1.2 cm previously 0.5cm on CT Jan 2023. Referred to pulmonary. Repeat CT 12/1/23 with interval progression to 1.5 cm RLL nodule. No other nodules or adenopathy. Bronchoscopy 12/11/23: RLL squamous cell carcinoma. 4L and 7 negative for malignancy. PET with RLL nodule SUV 6.1, no mediastinal or hilar avidity. Chronic cough with phlegm. Claudication ~ 50 yds. Occasional resting leg pain. Not on blood thinners. On pentoxifylline.      Smoker. 2-3ppd smoker however down to 1.5 ppd.   PSH: jaw reconstruction with plates - tracheostomy with decannulation within month of placement, excision of melanoma R shoulder, appendectomy, laryngeal vs VC mass biopsy       Good neck extension.   No issues with placement of 9-0 ETT. Good view of cords.

## 2024-01-19 NOTE — NURSING TRANSFER
Nursing Transfer Note      1/18/2024   8:02 PM    Reason patient is being transferred: post op     Transfer To: 1001    Transfer via bed    Transfer with CT and NC     Transported by RN x 2    Additional Lines: Chest Tube    Medicines sent: insulin pen    Patient belongings transferred with patient:  n/a     Chart send with patient: Yes    Notified: spouse via telephone     Patient reassessed at: 1/18/2024 2000

## 2024-01-19 NOTE — PLAN OF CARE
[facility] Plan of Care Note  Dx: R lobectomy    Shift Events: Chest tube care    Goals of Care: Safety, comfort    Neuro: AAO    Vital Signs: WNL    Respiratory: 2L    Diet: Clears    Is patient tolerating current diet? Yes    GTTS: None    Urine Output/Bowel Movement: Per charting    Drains/Tubes/Tube Feeds (include total output/shift): Per charting    Lines: PIV    Accuchecks:ACHS    Skin: R chest tube, lap sites    Fall Risk Score: Per charting    Activity level? Up with assist    Any scheduled procedures? None    Any safety concerns? Fall risk (chest tube, IV)    Other: None

## 2024-01-19 NOTE — HOSPITAL COURSE
Patient admitted 01/18/24 following robotic right lower lobectomy with MLND. Tolerated procedure well. POD1 AFVSS. Chest tube placed to waterseal on AM rounds. Expiratory air leak with cough and IS use.

## 2024-01-19 NOTE — OPERATIVE NOTE ADDENDUM
Certification of Assistant at Surgery       Surgery Date: 1/18/2024     Participating Surgeons:  Surgeon(s) and Role:     * Renny Rowe MD - Primary     * Luzma Solomon PA-C - Assisting     * Renny Rivera DO - Fellow    Procedures:  Procedure(s) (LRB):  XI ROBOTIC RIGHT LOWER LOBECTOMY,LUNG (Right)  LYMPHADENECTOMY (Right)  BLOCK, NERVE, INTERCOSTAL, 2 OR MORE    Assistant Surgeon's Certification of Necessity:  I understand that section 1842 (b) (6) (d) of the Social Security Act generally prohibits Medicare Part B reasonable charge payment for the services of assistants at surgery in teaching hospitals when qualified residents are available to furnish such services. I certify that the services for which payment is claimed were medically necessary, and that no qualified resident was available to perform the services. I further understand that these services are subject to post-payment review by the Medicare carrier.      Luzma Solomon PA-C    01/19/2024  7:52 AM

## 2024-01-20 LAB
ANION GAP SERPL CALC-SCNC: 8 MMOL/L (ref 8–16)
BUN SERPL-MCNC: 17 MG/DL (ref 8–23)
CALCIUM SERPL-MCNC: 8.5 MG/DL (ref 8.7–10.5)
CHLORIDE SERPL-SCNC: 98 MMOL/L (ref 95–110)
CO2 SERPL-SCNC: 30 MMOL/L (ref 23–29)
CREAT SERPL-MCNC: 1.1 MG/DL (ref 0.5–1.4)
EST. GFR  (NO RACE VARIABLE): >60 ML/MIN/1.73 M^2
GLUCOSE SERPL-MCNC: 234 MG/DL (ref 70–110)
POCT GLUCOSE: 194 MG/DL (ref 70–110)
POCT GLUCOSE: 196 MG/DL (ref 70–110)
POCT GLUCOSE: 215 MG/DL (ref 70–110)
POCT GLUCOSE: 245 MG/DL (ref 70–110)
POTASSIUM SERPL-SCNC: 3.7 MMOL/L (ref 3.5–5.1)
SODIUM SERPL-SCNC: 136 MMOL/L (ref 136–145)

## 2024-01-20 PROCEDURE — 20600001 HC STEP DOWN PRIVATE ROOM

## 2024-01-20 PROCEDURE — 36415 COLL VENOUS BLD VENIPUNCTURE: CPT | Performed by: STUDENT IN AN ORGANIZED HEALTH CARE EDUCATION/TRAINING PROGRAM

## 2024-01-20 PROCEDURE — 25000003 PHARM REV CODE 250: Performed by: STUDENT IN AN ORGANIZED HEALTH CARE EDUCATION/TRAINING PROGRAM

## 2024-01-20 PROCEDURE — 63600175 PHARM REV CODE 636 W HCPCS: Performed by: STUDENT IN AN ORGANIZED HEALTH CARE EDUCATION/TRAINING PROGRAM

## 2024-01-20 PROCEDURE — 94761 N-INVAS EAR/PLS OXIMETRY MLT: CPT

## 2024-01-20 PROCEDURE — 63600175 PHARM REV CODE 636 W HCPCS: Performed by: SURGERY

## 2024-01-20 PROCEDURE — 25000003 PHARM REV CODE 250: Performed by: SURGERY

## 2024-01-20 PROCEDURE — 99900035 HC TECH TIME PER 15 MIN (STAT)

## 2024-01-20 PROCEDURE — 80048 BASIC METABOLIC PNL TOTAL CA: CPT | Performed by: STUDENT IN AN ORGANIZED HEALTH CARE EDUCATION/TRAINING PROGRAM

## 2024-01-20 PROCEDURE — 94640 AIRWAY INHALATION TREATMENT: CPT

## 2024-01-20 PROCEDURE — 25000242 PHARM REV CODE 250 ALT 637 W/ HCPCS: Performed by: STUDENT IN AN ORGANIZED HEALTH CARE EDUCATION/TRAINING PROGRAM

## 2024-01-20 RX ORDER — FUROSEMIDE 10 MG/ML
20 INJECTION INTRAMUSCULAR; INTRAVENOUS ONCE
Status: COMPLETED | OUTPATIENT
Start: 2024-01-20 | End: 2024-01-20

## 2024-01-20 RX ADMIN — ENOXAPARIN SODIUM 40 MG: 40 INJECTION SUBCUTANEOUS at 04:01

## 2024-01-20 RX ADMIN — IPRATROPIUM BROMIDE AND ALBUTEROL SULFATE 3 ML: .5; 3 SOLUTION RESPIRATORY (INHALATION) at 01:01

## 2024-01-20 RX ADMIN — ACETAMINOPHEN 1000 MG: 500 TABLET ORAL at 10:01

## 2024-01-20 RX ADMIN — SENNOSIDES AND DOCUSATE SODIUM 1 TABLET: 8.6; 5 TABLET ORAL at 08:01

## 2024-01-20 RX ADMIN — ATORVASTATIN CALCIUM 40 MG: 40 TABLET, FILM COATED ORAL at 08:01

## 2024-01-20 RX ADMIN — INSULIN ASPART 4 UNITS: 100 INJECTION, SOLUTION INTRAVENOUS; SUBCUTANEOUS at 08:01

## 2024-01-20 RX ADMIN — PENTOXIFYLLINE 400 MG: 400 TABLET, EXTENDED RELEASE ORAL at 12:01

## 2024-01-20 RX ADMIN — PENTOXIFYLLINE 400 MG: 400 TABLET, EXTENDED RELEASE ORAL at 08:01

## 2024-01-20 RX ADMIN — PENTOXIFYLLINE 400 MG: 400 TABLET, EXTENDED RELEASE ORAL at 04:01

## 2024-01-20 RX ADMIN — LIDOCAINE 1 PATCH: 700 PATCH TOPICAL at 02:01

## 2024-01-20 RX ADMIN — ACETAMINOPHEN 1000 MG: 500 TABLET ORAL at 02:01

## 2024-01-20 RX ADMIN — POLYETHYLENE GLYCOL 3350 17 G: 17 POWDER, FOR SOLUTION ORAL at 08:01

## 2024-01-20 RX ADMIN — METHOCARBAMOL 500 MG: 500 TABLET ORAL at 12:01

## 2024-01-20 RX ADMIN — IPRATROPIUM BROMIDE AND ALBUTEROL SULFATE 3 ML: .5; 3 SOLUTION RESPIRATORY (INHALATION) at 09:01

## 2024-01-20 RX ADMIN — METHOCARBAMOL 500 MG: 500 TABLET ORAL at 08:01

## 2024-01-20 RX ADMIN — FUROSEMIDE 20 MG: 10 INJECTION, SOLUTION INTRAMUSCULAR; INTRAVENOUS at 09:01

## 2024-01-20 RX ADMIN — OXYCODONE HYDROCHLORIDE 10 MG: 10 TABLET ORAL at 03:01

## 2024-01-20 RX ADMIN — ESCITALOPRAM OXALATE 20 MG: 20 TABLET ORAL at 08:01

## 2024-01-20 RX ADMIN — BUPROPION HYDROCHLORIDE 150 MG: 150 TABLET, FILM COATED, EXTENDED RELEASE ORAL at 08:01

## 2024-01-20 RX ADMIN — OXYCODONE HYDROCHLORIDE 10 MG: 10 TABLET ORAL at 12:01

## 2024-01-20 RX ADMIN — POTASSIUM BICARBONATE 25 MEQ: 978 TABLET, EFFERVESCENT ORAL at 09:01

## 2024-01-20 RX ADMIN — INSULIN ASPART 2 UNITS: 100 INJECTION, SOLUTION INTRAVENOUS; SUBCUTANEOUS at 10:01

## 2024-01-20 RX ADMIN — ASPIRIN 81 MG: 81 TABLET, COATED ORAL at 06:01

## 2024-01-20 RX ADMIN — INSULIN ASPART 2 UNITS: 100 INJECTION, SOLUTION INTRAVENOUS; SUBCUTANEOUS at 12:01

## 2024-01-20 RX ADMIN — ACETAMINOPHEN 1000 MG: 500 TABLET ORAL at 06:01

## 2024-01-20 RX ADMIN — GABAPENTIN 300 MG: 300 CAPSULE ORAL at 08:01

## 2024-01-20 RX ADMIN — INSULIN ASPART 2 UNITS: 100 INJECTION, SOLUTION INTRAVENOUS; SUBCUTANEOUS at 04:01

## 2024-01-20 RX ADMIN — OXYCODONE HYDROCHLORIDE 10 MG: 10 TABLET ORAL at 07:01

## 2024-01-20 RX ADMIN — IPRATROPIUM BROMIDE AND ALBUTEROL SULFATE 3 ML: .5; 3 SOLUTION RESPIRATORY (INHALATION) at 06:01

## 2024-01-20 RX ADMIN — METHOCARBAMOL 500 MG: 500 TABLET ORAL at 04:01

## 2024-01-20 RX ADMIN — GABAPENTIN 300 MG: 300 CAPSULE ORAL at 02:01

## 2024-01-20 NOTE — PLAN OF CARE
[facility] Plan of Care Note  Dx: R lobectomy     Shift Events: Chest tube care     Goals of Care: Safety, comfort, pain control     Neuro: AAO     Vital Signs: WNL     Respiratory: RA-2L     Diet: Diabetic     Is patient tolerating current diet? Yes     GTTS: None     Urine Output/Bowel Movement: Per charting     Drains/Tubes/Tube Feeds (include total output/shift): Per charting     Lines: PIV     Accuchecks: ACHS     Skin: R chest tube, lap sites     Fall Risk Score: Per charting     Activity level? Independent     Any scheduled procedures? None     Any safety concerns? Fall risk (chest tube, IV)     Other: None

## 2024-01-20 NOTE — PROGRESS NOTES
This nurse clamped patients chest tube at this time and updated him on plan of care. No further questions from patient

## 2024-01-20 NOTE — SUBJECTIVE & OBJECTIVE
Interval History: POD3 AFVSS. Chest tube 365 ml serosanguinous output. Still with small expiratory air leak with cough and IS use. Feels well.     Medications:  Continuous Infusions:  Scheduled Meds:   acetaminophen  1,000 mg Oral Q8H    albuterol-ipratropium  3 mL Nebulization Q6H    aspirin  81 mg Oral QAM    atorvastatin  40 mg Oral Daily    buPROPion  150 mg Oral Daily    enoxparin  40 mg Subcutaneous Q24H (prophylaxis, 1700)    EScitalopram oxalate  20 mg Oral QHS    furosemide (LASIX) injection  20 mg Intravenous Once    gabapentin  300 mg Oral TID    insulin detemir U-100  15 Units Subcutaneous QHS    LIDOcaine  1 patch Transdermal Q24H    methocarbamoL  500 mg Oral QID    pentoxifylline  400 mg Oral TID WM    polyethylene glycol  17 g Oral Daily    potassium bicarbonate  25 mEq Oral Once    senna-docusate 8.6-50 mg  1 tablet Oral BID     PRN Meds:bisacodyL, dextrose 10%, dextrose 10%, glucagon (human recombinant), glucose, glucose, insulin aspart U-100, ondansetron, oxyCODONE, oxyCODONE, sodium chloride 0.9%     Review of patient's allergies indicates:  No Known Allergies  Objective:     Vital Signs (Most Recent):  Temp: 98.5 °F (36.9 °C) (01/20/24 0729)  Pulse: 77 (01/20/24 0729)  Resp: 18 (01/20/24 0729)  BP: (!) 115/59 (01/20/24 0729)  SpO2: 96 % (01/20/24 0729) Vital Signs (24h Range):  Temp:  [97.4 °F (36.3 °C)-98.5 °F (36.9 °C)] 98.5 °F (36.9 °C)  Pulse:  [64-79] 77  Resp:  [17-19] 18  SpO2:  [92 %-96 %] 96 %  BP: (107-145)/(55-68) 115/59     Intake/Output - Last 3 Shifts         01/18 0700  01/19 0659 01/19 0700 01/20 0659 01/20 0700 01/21 0659    P.O. 600 240     Total Intake(mL/kg) 600 (7.2) 240 (2.9)     Urine (mL/kg/hr) 1100 (0.6) 2900 (1.5)     Stool  0     Chest Tube 450 365     Total Output 1550 3265     Net -950 -3025            Stool Occurrence  0 x             SpO2: 96 %        Physical Exam  Constitutional:       Appearance: Normal appearance. He is normal weight.   Eyes:      Extraocular  Movements: Extraocular movements intact.   Cardiovascular:      Rate and Rhythm: Normal rate and regular rhythm.      Pulses: Normal pulses.   Pulmonary:      Effort: Pulmonary effort is normal.      Breath sounds: Normal breath sounds.      Comments: Chest tube in place on right side. Expiratory air leak.   Abdominal:      General: Abdomen is flat.      Palpations: Abdomen is soft.   Skin:     General: Skin is warm and dry.      Comments: Incisions c/d/i   Neurological:      General: No focal deficit present.      Mental Status: He is alert and oriented to person, place, and time. Mental status is at baseline.   Psychiatric:         Mood and Affect: Mood normal.         Behavior: Behavior normal.         Thought Content: Thought content normal.            Significant Labs:  All pertinent labs from the last 24 hours have been reviewed.    Significant Diagnostics:  CXR: I have reviewed all pertinent results/findings within the past 24 hours    VTE Risk Mitigation (From admission, onward)           Ordered     enoxaparin injection 40 mg  Every 24 hours         01/18/24 1652     Place DARLINE hose  Until discontinued         01/18/24 1208     Place sequential compression device  Until discontinued         01/18/24 1208

## 2024-01-20 NOTE — ASSESSMENT & PLAN NOTE
POD2 s/p robotic right lower lobectomy with MLND.    - CT to waterseal. CXR pending   - Continue diuresis with lasix  - Wean O2; goal O2 saturation >= 88%  - Multimodal pain management  - Daily CXR  - DVT ppx  - OOB, ambulate as tolerated  - Diabetic diet

## 2024-01-20 NOTE — PROGRESS NOTES
Uriel Sanchez - [facility]  Thoracic Surgery  Progress Note    Subjective:     History of Present Illness:  Patient is a 68 y.o. male smoker with DM on insulin, COPD, melanoma and PAD (angioplasty w/o stent) here today for RLL squamous cell carcinoma. Admitted to [facility] with food poisoning and during work-up underwent CT abdomen which identified lung nodule measuring 1.2 cm previously 0.5cm on CT Jan 2023. Referred to pulmonary. Repeat CT 12/1/23 with interval progression to 1.5 cm RLL nodule. No other nodules or adenopathy. Bronchoscopy 12/11/23: RLL squamous cell carcinoma. 4L and 7 negative for malignancy. PET with RLL nodule SUV 6.1, no mediastinal or hilar avidity. Chronic cough with phlegm. Claudication ~ 50 yds. Occasional resting leg pain. Not on blood thinners. On pentoxifylline.      Smoker. 2-3ppd smoker however down to 1.5 ppd.   PSH: jaw reconstruction with plates - tracheostomy with decannulation within month of placement, excision of melanoma R shoulder, appendectomy, laryngeal vs VC mass biopsy       Good neck extension.   No issues with placement of 9-0 ETT. Good view of cords.     Post-Op Info:  Procedure(s) (LRB):  XI ROBOTIC RIGHT LOWER LOBECTOMY,LUNG (Right)  LYMPHADENECTOMY (Right)  BLOCK, NERVE, INTERCOSTAL, 2 OR MORE   2 Days Post-Op     Interval History: POD3 AFVSS. Chest tube 365 ml serosanguinous output. Still with small expiratory air leak with cough and IS use. Feels well.     Medications:  Continuous Infusions:  Scheduled Meds:   acetaminophen  1,000 mg Oral Q8H    albuterol-ipratropium  3 mL Nebulization Q6H    aspirin  81 mg Oral QAM    atorvastatin  40 mg Oral Daily    buPROPion  150 mg Oral Daily    enoxparin  40 mg Subcutaneous Q24H (prophylaxis, 1700)    EScitalopram oxalate  20 mg Oral QHS    furosemide (LASIX) injection  20 mg Intravenous Once    gabapentin  300 mg Oral TID    insulin detemir U-100  15 Units Subcutaneous QHS    LIDOcaine  1 patch Transdermal Q24H    methocarbamoL  500 mg  Oral QID    pentoxifylline  400 mg Oral TID WM    polyethylene glycol  17 g Oral Daily    potassium bicarbonate  25 mEq Oral Once    senna-docusate 8.6-50 mg  1 tablet Oral BID     PRN Meds:bisacodyL, dextrose 10%, dextrose 10%, glucagon (human recombinant), glucose, glucose, insulin aspart U-100, ondansetron, oxyCODONE, oxyCODONE, sodium chloride 0.9%     Review of patient's allergies indicates:  No Known Allergies  Objective:     Vital Signs (Most Recent):  Temp: 98.5 °F (36.9 °C) (01/20/24 0729)  Pulse: 77 (01/20/24 0729)  Resp: 18 (01/20/24 0729)  BP: (!) 115/59 (01/20/24 0729)  SpO2: 96 % (01/20/24 0729) Vital Signs (24h Range):  Temp:  [97.4 °F (36.3 °C)-98.5 °F (36.9 °C)] 98.5 °F (36.9 °C)  Pulse:  [64-79] 77  Resp:  [17-19] 18  SpO2:  [92 %-96 %] 96 %  BP: (107-145)/(55-68) 115/59     Intake/Output - Last 3 Shifts         01/18 0700  01/19 0659 01/19 0700 01/20 0659 01/20 0700  01/21 0659    P.O. 600 240     Total Intake(mL/kg) 600 (7.2) 240 (2.9)     Urine (mL/kg/hr) 1100 (0.6) 2900 (1.5)     Stool  0     Chest Tube 450 365     Total Output 1550 3265     Net -950 -3025            Stool Occurrence  0 x             SpO2: 96 %       Physical Exam  Constitutional:       Appearance: Normal appearance. He is normal weight.   Eyes:      Extraocular Movements: Extraocular movements intact.   Cardiovascular:      Rate and Rhythm: Normal rate and regular rhythm.      Pulses: Normal pulses.   Pulmonary:      Effort: Pulmonary effort is normal.      Breath sounds: Normal breath sounds.      Comments: Chest tube in place on right side. Expiratory air leak.   Abdominal:      General: Abdomen is flat.      Palpations: Abdomen is soft.   Skin:     General: Skin is warm and dry.      Comments: Incisions c/d/i   Neurological:      General: No focal deficit present.      Mental Status: He is alert and oriented to person, place, and time. Mental status is at baseline.   Psychiatric:         Mood and Affect: Mood normal.          Behavior: Behavior normal.         Thought Content: Thought content normal.            Significant Labs:  All pertinent labs from the last 24 hours have been reviewed.    Significant Diagnostics:  CXR: I have reviewed all pertinent results/findings within the past 24 hours    VTE Risk Mitigation (From admission, onward)           Ordered     enoxaparin injection 40 mg  Every 24 hours         01/18/24 1652     Place DARLINE hose  Until discontinued         01/18/24 1208     Place sequential compression device  Until discontinued         01/18/24 1208                  Assessment/Plan:     * Squamous cell carcinoma of lower lobe of right lung  POD2 s/p robotic right lower lobectomy with MLND.    - CT to waterseal. CXR pending   - Continue diuresis with lasix  - Wean O2; goal O2 saturation >= 88%  - Multimodal pain management  - Daily CXR  - DVT ppx  - OOB, ambulate as tolerated  - Diabetic diet         Marah Benson MD  Thoracic Surgery  [facility]

## 2024-01-20 NOTE — PLAN OF CARE
[facility] Plan of Care Note  Dx: R lobectomy     Shift Events: Chest tube clamped     Goals of Care: Safety, comfort, pain control     Neuro: AAO     Vital Signs: WNL     Respiratory: RA-2L     Diet: Diabetic     Is patient tolerating current diet? Yes     GTTS: None     Urine Output/Bowel Movement: Per charting     Drains/Tubes/Tube Feeds (include total output/shift): Per charting     Lines: PIV     Accuchecks: ACHS     Skin: R chest tube, lap sites     Fall Risk Score: Per charting     Activity level? Independent     Any scheduled procedures? None     Any safety concerns? Fall risk (chest tube, IV)     Other: None

## 2024-01-20 NOTE — PLAN OF CARE
[facility] Plan of Care Note  Dx: R lobectomy    Shift Events: Chest tube clamped, chest tube insertion site dressing change x3, pain medication, patient ambulated halls 3x, incentive spirometer usage through out the day     Goals of Care: Safety, comfort    Neuro: AAO    Vital Signs: WNL    Respiratory: 2L    Diet: Clears    Is patient tolerating current diet? Yes    GTTS: None    Urine Output/Bowel Movement: Per charting    Drains/Tubes/Tube Feeds (include total output/shift): Per charting    Lines: PIV    Accuchecks:ACHS    Skin: R chest tube, lap sites    Fall Risk Score: Per charting    Activity level? Up with assist    Any scheduled procedures? None    Any safety concerns? Fall risk (chest tube, IV)    Other: None

## 2024-01-21 LAB
POCT GLUCOSE: 145 MG/DL (ref 70–110)
POCT GLUCOSE: 177 MG/DL (ref 70–110)
POCT GLUCOSE: 178 MG/DL (ref 70–110)
POCT GLUCOSE: 199 MG/DL (ref 70–110)

## 2024-01-21 PROCEDURE — 25000003 PHARM REV CODE 250: Performed by: STUDENT IN AN ORGANIZED HEALTH CARE EDUCATION/TRAINING PROGRAM

## 2024-01-21 PROCEDURE — 25000242 PHARM REV CODE 250 ALT 637 W/ HCPCS: Performed by: STUDENT IN AN ORGANIZED HEALTH CARE EDUCATION/TRAINING PROGRAM

## 2024-01-21 PROCEDURE — 20600001 HC STEP DOWN PRIVATE ROOM

## 2024-01-21 PROCEDURE — 94761 N-INVAS EAR/PLS OXIMETRY MLT: CPT

## 2024-01-21 PROCEDURE — 94640 AIRWAY INHALATION TREATMENT: CPT

## 2024-01-21 PROCEDURE — 99900035 HC TECH TIME PER 15 MIN (STAT)

## 2024-01-21 PROCEDURE — 63600175 PHARM REV CODE 636 W HCPCS: Performed by: STUDENT IN AN ORGANIZED HEALTH CARE EDUCATION/TRAINING PROGRAM

## 2024-01-21 RX ADMIN — ATORVASTATIN CALCIUM 40 MG: 40 TABLET, FILM COATED ORAL at 08:01

## 2024-01-21 RX ADMIN — ACETAMINOPHEN 1000 MG: 500 TABLET ORAL at 05:01

## 2024-01-21 RX ADMIN — OXYCODONE HYDROCHLORIDE 10 MG: 10 TABLET ORAL at 01:01

## 2024-01-21 RX ADMIN — PENTOXIFYLLINE 400 MG: 400 TABLET, EXTENDED RELEASE ORAL at 08:01

## 2024-01-21 RX ADMIN — IPRATROPIUM BROMIDE AND ALBUTEROL SULFATE 3 ML: .5; 3 SOLUTION RESPIRATORY (INHALATION) at 02:01

## 2024-01-21 RX ADMIN — ACETAMINOPHEN 1000 MG: 500 TABLET ORAL at 09:01

## 2024-01-21 RX ADMIN — ESCITALOPRAM OXALATE 20 MG: 20 TABLET ORAL at 09:01

## 2024-01-21 RX ADMIN — INSULIN ASPART 2 UNITS: 100 INJECTION, SOLUTION INTRAVENOUS; SUBCUTANEOUS at 05:01

## 2024-01-21 RX ADMIN — BUPROPION HYDROCHLORIDE 150 MG: 150 TABLET, FILM COATED, EXTENDED RELEASE ORAL at 08:01

## 2024-01-21 RX ADMIN — PENTOXIFYLLINE 400 MG: 400 TABLET, EXTENDED RELEASE ORAL at 12:01

## 2024-01-21 RX ADMIN — ACETAMINOPHEN 1000 MG: 500 TABLET ORAL at 02:01

## 2024-01-21 RX ADMIN — IPRATROPIUM BROMIDE AND ALBUTEROL SULFATE 3 ML: .5; 3 SOLUTION RESPIRATORY (INHALATION) at 01:01

## 2024-01-21 RX ADMIN — LIDOCAINE 1 PATCH: 700 PATCH TOPICAL at 12:01

## 2024-01-21 RX ADMIN — PENTOXIFYLLINE 400 MG: 400 TABLET, EXTENDED RELEASE ORAL at 04:01

## 2024-01-21 RX ADMIN — IPRATROPIUM BROMIDE AND ALBUTEROL SULFATE 3 ML: .5; 3 SOLUTION RESPIRATORY (INHALATION) at 08:01

## 2024-01-21 RX ADMIN — GABAPENTIN 300 MG: 300 CAPSULE ORAL at 08:01

## 2024-01-21 RX ADMIN — OXYCODONE HYDROCHLORIDE 10 MG: 10 TABLET ORAL at 02:01

## 2024-01-21 RX ADMIN — SENNOSIDES AND DOCUSATE SODIUM 1 TABLET: 8.6; 5 TABLET ORAL at 08:01

## 2024-01-21 RX ADMIN — SENNOSIDES AND DOCUSATE SODIUM 1 TABLET: 8.6; 5 TABLET ORAL at 09:01

## 2024-01-21 RX ADMIN — METHOCARBAMOL 500 MG: 500 TABLET ORAL at 08:01

## 2024-01-21 RX ADMIN — IPRATROPIUM BROMIDE AND ALBUTEROL SULFATE 3 ML: .5; 3 SOLUTION RESPIRATORY (INHALATION) at 07:01

## 2024-01-21 RX ADMIN — METHOCARBAMOL 500 MG: 500 TABLET ORAL at 10:01

## 2024-01-21 RX ADMIN — INSULIN ASPART 1 UNITS: 100 INJECTION, SOLUTION INTRAVENOUS; SUBCUTANEOUS at 09:01

## 2024-01-21 RX ADMIN — METHOCARBAMOL 500 MG: 500 TABLET ORAL at 12:01

## 2024-01-21 RX ADMIN — POLYETHYLENE GLYCOL 3350 17 G: 17 POWDER, FOR SOLUTION ORAL at 08:01

## 2024-01-21 RX ADMIN — ASPIRIN 81 MG: 81 TABLET, COATED ORAL at 08:01

## 2024-01-21 RX ADMIN — GABAPENTIN 300 MG: 300 CAPSULE ORAL at 09:01

## 2024-01-21 RX ADMIN — OXYCODONE HYDROCHLORIDE 10 MG: 10 TABLET ORAL at 05:01

## 2024-01-21 RX ADMIN — METHOCARBAMOL 500 MG: 500 TABLET ORAL at 04:01

## 2024-01-21 RX ADMIN — OXYCODONE HYDROCHLORIDE 5 MG: 5 TABLET ORAL at 10:01

## 2024-01-21 RX ADMIN — GABAPENTIN 300 MG: 300 CAPSULE ORAL at 02:01

## 2024-01-21 RX ADMIN — ENOXAPARIN SODIUM 40 MG: 40 INJECTION SUBCUTANEOUS at 04:01

## 2024-01-21 RX ADMIN — INSULIN ASPART 2 UNITS: 100 INJECTION, SOLUTION INTRAVENOUS; SUBCUTANEOUS at 12:01

## 2024-01-21 NOTE — NURSING
On call surgery team paged to inform them of patient current pain    Reached out to MD, made aware. Patient now states his pain is 4/10, much better. MD aware, no new orders in place at this time

## 2024-01-21 NOTE — NURSING
Pt c/o pain and swelling to right chest tube site, notified MD and advised to connect chest tube to wall suction at 20mmg to relieve pressure    2030-Pt relieved of pressure to right lower chest tube site, resting quietly, chest tube chamber secure, no distress noted

## 2024-01-21 NOTE — SUBJECTIVE & OBJECTIVE
Interval History: Chest tube output 110 cc. Clamp trial yesterday with no detrimental interval CXR change, however noted clinical worsening of subq emphysema with associated pain and swelling yesterday evening. CT placed back to suction - this overall helped. He reports less pain and swelling currently, although notes it is still there. Air leak resolved this am. Otherwise feeling well.     Medications:  Continuous Infusions:  Scheduled Meds:   acetaminophen  1,000 mg Oral Q8H    albuterol-ipratropium  3 mL Nebulization Q6H    aspirin  81 mg Oral QAM    atorvastatin  40 mg Oral Daily    buPROPion  150 mg Oral Daily    enoxparin  40 mg Subcutaneous Q24H (prophylaxis, 1700)    EScitalopram oxalate  20 mg Oral QHS    gabapentin  300 mg Oral TID    insulin detemir U-100  20 Units Subcutaneous QHS    LIDOcaine  1 patch Transdermal Q24H    methocarbamoL  500 mg Oral QID    pentoxifylline  400 mg Oral TID WM    polyethylene glycol  17 g Oral Daily    senna-docusate 8.6-50 mg  1 tablet Oral BID     PRN Meds:bisacodyL, dextrose 10%, dextrose 10%, glucagon (human recombinant), glucose, glucose, insulin aspart U-100, ondansetron, oxyCODONE, oxyCODONE, sodium chloride 0.9%     Review of patient's allergies indicates:  No Known Allergies  Objective:     Vital Signs (Most Recent):  Temp: 97.8 °F (36.6 °C) (01/21/24 0749)  Pulse: 72 (01/21/24 0749)  Resp: 18 (01/21/24 0749)  BP: 114/65 (01/21/24 0749)  SpO2: (!) 91 % (01/21/24 0749) Vital Signs (24h Range):  Temp:  [97.8 °F (36.6 °C)-98.5 °F (36.9 °C)] 97.8 °F (36.6 °C)  Pulse:  [70-85] 72  Resp:  [16-19] 18  SpO2:  [91 %-99 %] 91 %  BP: (114-135)/(58-68) 114/65     Intake/Output - Last 3 Shifts         01/19 0700 01/20 0659 01/20 0700 01/21 0659 01/21 0700 01/22 0659    P.O. 240 480     Total Intake(mL/kg) 240 (2.9) 480 (5.8)     Urine (mL/kg/hr) 2900 (1.5) 1300 (0.7)     Stool 0      Chest Tube 365 110     Total Output 1555 1410     Net -302 -930            Stool Occurrence  0 x              SpO2: (!) 91 %        Physical Exam  Constitutional:       Appearance: Normal appearance. He is normal weight.   Eyes:      Extraocular Movements: Extraocular movements intact.   Cardiovascular:      Rate and Rhythm: Normal rate and regular rhythm.      Pulses: Normal pulses.   Pulmonary:      Effort: Pulmonary effort is normal.      Breath sounds: Normal breath sounds.      Comments: Chest tube in place on right side. No air leak.   Right chest wall subq ephysema, improved from last night  Abdominal:      General: Abdomen is flat.      Palpations: Abdomen is soft.   Skin:     General: Skin is warm and dry.      Comments: Incisions c/d/i   Neurological:      General: No focal deficit present.      Mental Status: He is alert and oriented to person, place, and time. Mental status is at baseline.   Psychiatric:         Mood and Affect: Mood normal.         Behavior: Behavior normal.         Thought Content: Thought content normal.            Significant Labs:  All pertinent labs from the last 24 hours have been reviewed.    Significant Diagnostics:  CXR: I have reviewed all pertinent results/findings within the past 24 hours    VTE Risk Mitigation (From admission, onward)           Ordered     enoxaparin injection 40 mg  Every 24 hours         01/18/24 1652     Place DARLINE hose  Until discontinued         01/18/24 1208     Place sequential compression device  Until discontinued         01/18/24 1208

## 2024-01-21 NOTE — PROGRESS NOTES
[facility]riel [facility] - [facility]  Thoracic Surgery  Progress Note    Subjective:     History of Present Illness:  Patient is a 68 y.o. male smoker with DM on insulin, COPD, melanoma and PAD (angioplasty w/o stent) here today for RLL squamous cell carcinoma. Admitted to [facility] with food poisoning and during work-up underwent CT abdomen which identified lung nodule measuring 1.2 cm previously 0.5cm on CT Jan 2023. Referred to pulmonary. Repeat CT 12/1/23 with interval progression to 1.5 cm RLL nodule. No other nodules or adenopathy. Bronchoscopy 12/11/23: RLL squamous cell carcinoma. 4L and 7 negative for malignancy. PET with RLL nodule SUV 6.1, no mediastinal or hilar avidity. Chronic cough with phlegm. Claudication ~ 50 yds. Occasional resting leg pain. Not on blood thinners. On pentoxifylline.      Smoker. 2-3ppd smoker however down to 1.5 ppd.   PSH: jaw reconstruction with plates - tracheostomy with decannulation within month of placement, excision of melanoma R shoulder, appendectomy, laryngeal vs VC mass biopsy       Good neck extension.   No issues with placement of 9-0 ETT. Good view of cords.     Post-Op Info:  Procedure(s) (LRB):  XI ROBOTIC RIGHT LOWER LOBECTOMY,LUNG (Right)  LYMPHADENECTOMY (Right)  BLOCK, NERVE, INTERCOSTAL, 2 OR MORE   3 Days Post-Op     Interval History: Chest tube output 110 cc. Clamp trial yesterday with no detrimental interval CXR change, however noted clinical worsening of subq emphysema with associated pain and swelling yesterday evening. CT placed back to suction - this overall helped. He reports less pain and swelling currently, although notes it is still there. Air leak resolved this am. Otherwise feeling well.     Medications:  Continuous Infusions:  Scheduled Meds:   acetaminophen  1,000 mg Oral Q8H    albuterol-ipratropium  3 mL Nebulization Q6H    aspirin  81 mg Oral QAM    atorvastatin  40 mg Oral Daily    buPROPion  150 mg Oral Daily    enoxparin  40 mg Subcutaneous Q24H (prophylaxis,  1700)    EScitalopram oxalate  20 mg Oral QHS    gabapentin  300 mg Oral TID    insulin detemir U-100  20 Units Subcutaneous QHS    LIDOcaine  1 patch Transdermal Q24H    methocarbamoL  500 mg Oral QID    pentoxifylline  400 mg Oral TID WM    polyethylene glycol  17 g Oral Daily    senna-docusate 8.6-50 mg  1 tablet Oral BID     PRN Meds:bisacodyL, dextrose 10%, dextrose 10%, glucagon (human recombinant), glucose, glucose, insulin aspart U-100, ondansetron, oxyCODONE, oxyCODONE, sodium chloride 0.9%     Review of patient's allergies indicates:  No Known Allergies  Objective:     Vital Signs (Most Recent):  Temp: 97.8 °F (36.6 °C) (01/21/24 0749)  Pulse: 72 (01/21/24 0749)  Resp: 18 (01/21/24 0749)  BP: 114/65 (01/21/24 0749)  SpO2: (!) 91 % (01/21/24 0749) Vital Signs (24h Range):  Temp:  [97.8 °F (36.6 °C)-98.5 °F (36.9 °C)] 97.8 °F (36.6 °C)  Pulse:  [70-85] 72  Resp:  [16-19] 18  SpO2:  [91 %-99 %] 91 %  BP: (114-135)/(58-68) 114/65     Intake/Output - Last 3 Shifts         01/19 0700  01/20 0659 01/20 0700 01/21 0659 01/21 0700 01/22 0659    P.O. 240 480     Total Intake(mL/kg) 240 (2.9) 480 (5.8)     Urine (mL/kg/hr) 2900 (1.5) 1300 (0.7)     Stool 0      Chest Tube 365 110     Total Output 3265 1410     Net -3025 -930            Stool Occurrence 0 x              SpO2: (!) 91 %       Physical Exam  Constitutional:       Appearance: Normal appearance. He is normal weight.   Eyes:      Extraocular Movements: Extraocular movements intact.   Cardiovascular:      Rate and Rhythm: Normal rate and regular rhythm.      Pulses: Normal pulses.   Pulmonary:      Effort: Pulmonary effort is normal.      Breath sounds: Normal breath sounds.      Comments: Chest tube in place on right side. No air leak.   Right chest wall subq ephysema, improved from last night  Abdominal:      General: Abdomen is flat.      Palpations: Abdomen is soft.   Skin:     General: Skin is warm and dry.      Comments: Incisions c/d/i    Neurological:      General: No focal deficit present.      Mental Status: He is alert and oriented to person, place, and time. Mental status is at baseline.   Psychiatric:         Mood and Affect: Mood normal.         Behavior: Behavior normal.         Thought Content: Thought content normal.            Significant Labs:  All pertinent labs from the last 24 hours have been reviewed.    Significant Diagnostics:  CXR: I have reviewed all pertinent results/findings within the past 24 hours    VTE Risk Mitigation (From admission, onward)           Ordered     enoxaparin injection 40 mg  Every 24 hours         01/18/24 1652     Place DARLINE hose  Until discontinued         01/18/24 1208     Place sequential compression device  Until discontinued         01/18/24 1208                  Assessment/Plan:     * Squamous cell carcinoma of lower lobe of right lung  POD2 s/p robotic right lower lobectomy with MLND.    - CT to waterseal. CXR pending. Likely repeat clamp trial today.  - Wean O2; goal O2 saturation >= 88%  - Multimodal pain management  - Daily CXR  - DVT ppx  - OOB, ambulate as tolerated  - Diabetic diet         Marah Benson MD  Thoracic Surgery  [facility]

## 2024-01-21 NOTE — NURSING
Report received from off going nurse. Patient AAO. No signs of distress noted. Call light in reach. Bed low and locked. Will continue plan of care.     7:23a MD at bedside placed patient chest tube to water seal, suction removed. States to leave as is until further notice

## 2024-01-21 NOTE — PLAN OF CARE
[facility]riel [facility]  Initial Discharge Assessment       Primary Care Provider: Tio Sullivan MD    Admission Diagnosis: Squamous cell carcinoma of lower lobe of right lung [C34.31]  NSCLC of right lung [C34.91]    Admission Date: 1/18/2024    Pt confirmed home address and lives with Spouse Esperanza Tellez [phone] . Pt denied HH and DME. [facility] Pt spouse will provide transport home. PT with no current discharge needs. CM following.   Expected Discharge Date:     Transition of Care Barriers: None    Payor: HU[employer] MANAGED MEDICARE / Plan: HU[employer] MEDICARE HMO / Product Type: Capitation /     Extended Emergency Contact Information  Primary Emergency Contact: Esperanza Gardiner  Mobile Phone: [phone]  Relation: Spouse  Preferred language: English   needed? No    Discharge Plan A: Home with family  Discharge Plan B: Home      [facility] - [facility] [address] [facility]  [address] [facility] [address]  Phone: [phone] Fax: [phone]    [id number] [place], [place] - [address] [facility] [address] [facility] [address]  [address]  [facility] [address]  Phone: [phone] Fax: [phone]      Initial Assessment (most recent)       Adult Discharge Assessment - 01/21/24 0916          Discharge Assessment    Assessment Type Discharge Planning Assessment     Confirmed/corrected address, phone number and insurance Yes     Confirmed Demographics Correct on Facesheet     Source of Information patient     Communicated ILDEFONSO with patient/caregiver Yes     Reason For Admission Squamous cell carcinoma     People in Home spouse     Facility Arrived From: Home     Do you expect to return to your current living situation? Yes     Do you have help at home or someone to help you manage your care at home? Yes     Who are your caregiver(s) and their phone number(s)? Spouse Esperanza Tellez [phone]     Prior  to hospitilization cognitive status: Alert/Oriented     Current cognitive status: Alert/Oriented     Walking or Climbing Stairs Difficulty no     Dressing/Bathing Difficulty no     Home Layout Able to live on 1st floor     Equipment Currently Used at Home none     Readmission within 30 days? No     Patient currently being followed by outpatient case management? No     Do you currently have service(s) that help you manage your care at home? No     Is the pt/caregiver preference to resume services with current agency No     Do you take prescription medications? Yes     Do you have prescription coverage? Yes     Do you have any problems affording any of your prescribed medications? No     Is the patient taking medications as prescribed? yes     Who is going to help you get home at discharge? Spouse Esperanza Kitchen [phone]     How do you get to doctors appointments? car, drives self;family or friend will provide     Are you on dialysis? No     Do you take coumadin? No     Discharge Plan A Home with family     Discharge Plan B Home     DME Needed Upon Discharge  none     Discharge Plan discussed with: Patient     Transition of Care Barriers None        Physical Activity    On average, how many days per week do you engage in moderate to strenuous exercise (like a brisk walk)? 2 days     On average, how many minutes do you engage in exercise at this level? 10 min        Financial Resource Strain    How hard is it for you to pay for the very basics like food, housing, medical care, and heating? Not very hard        Housing Stability    In the last 12 months, was there a time when you were not able to pay the mortgage or rent on time? No     In the last 12 months, was there a time when you did not have a steady place to sleep or slept in a shelter (including now)? No        Transportation Needs    In the past 12 months, has lack of transportation kept you from medical appointments or from getting medications? No     In  the past 12 months, has lack of transportation kept you from meetings, work, or from getting things needed for daily living? No        Food Insecurity    Within the past 12 months, you worried that your food would run out before you got the money to buy more. Never true     Within the past 12 months, the food you bought just didn't last and you didn't have money to get more. Never true        Stress    Do you feel stress - tense, restless, nervous, or anxious, or unable to sleep at night because your mind is troubled all the time - these days? Only a little        Social Connections    In a typical week, how many times do you talk on the phone with family, friends, or neighbors? Twice a week     How often do you get together with friends or relatives? Twice a week     How often do you attend Lutheran or Sabianism services? Never     Do you belong to any clubs or organizations such as Lutheran groups, unions, fraternal or athletic groups, or school groups? No     How often do you attend meetings of the clubs or organizations you belong to? Never     Are you , , , , never , or living with a partner?         Alcohol Use    Q1: How often do you have a drink containing alcohol? Never     Q2: How many drinks containing alcohol do you have on a typical day when you are drinking? Patient does not drink     Q3: How often do you have six or more drinks on one occasion? Never        OTHER    Name(s) of People in Home Spouse Esperanza Tellez [phone]

## 2024-01-21 NOTE — ASSESSMENT & PLAN NOTE
POD2 s/p robotic right lower lobectomy with MLND.    - CT to waterseal. CXR pending. Likely repeat clamp trial today.  - Wean O2; goal O2 saturation >= 88%  - Multimodal pain management  - Daily CXR  - DVT ppx  - OOB, ambulate as tolerated  - Diabetic diet

## 2024-01-21 NOTE — NURSING
Patient states his left side of his abdomen is hurting, no swelling noted.  PRN pain meds given and states it knocked the edge off regarding pain. Nurse reached out to Dr. Benson. Awaiting reply

## 2024-01-21 NOTE — PLAN OF CARE
Pt in bed, no s/s of acute distress, no c/o voiced, safety measures in place, call light in reach  Problem: Adult Inpatient Plan of Care  Goal: Plan of Care Review  Outcome: Ongoing, Progressing  Goal: Absence of Hospital-Acquired Illness or Injury  Outcome: Ongoing, Progressing  Goal: Optimal Comfort and Wellbeing  Outcome: Ongoing, Progressing  Goal: Readiness for Transition of Care  Outcome: Ongoing, Progressing     Problem: Fall Injury Risk  Goal: Absence of Fall and Fall-Related Injury  Outcome: Ongoing, Progressing     Problem: Diabetes Comorbidity  Goal: Blood Glucose Level Within Targeted Range  Outcome: Ongoing, Progressing

## 2024-01-21 NOTE — CARE UPDATE
General Surgery Floor Call    Nurse called reporting pt feeling fullness of his chest and pain at the site of the tubing. CT had been on waterseal since this AM his CXR at 4PM today was stable however due to his pain and feeling of chest fullness. Placed CT back on wall suction.    Patel Rowley MD  General Surgery PGY-1

## 2024-01-21 NOTE — NURSING
Chest tube clamped per Dr. Benson, will continue to monitor and continue plan of care    1546p patient chest tube remains clamped, denies any discomfort/distress

## 2024-01-22 VITALS
RESPIRATION RATE: 16 BRPM | DIASTOLIC BLOOD PRESSURE: 65 MMHG | SYSTOLIC BLOOD PRESSURE: 138 MMHG | HEART RATE: 86 BPM | TEMPERATURE: 98 F | OXYGEN SATURATION: 95 % | WEIGHT: 183 LBS | BODY MASS INDEX: 24.25 KG/M2 | HEIGHT: 73 IN

## 2024-01-22 DIAGNOSIS — C34.31 SQUAMOUS CELL CARCINOMA OF LOWER LOBE OF RIGHT LUNG: Primary | ICD-10-CM

## 2024-01-22 LAB
POCT GLUCOSE: 239 MG/DL (ref 70–110)
POCT GLUCOSE: 283 MG/DL (ref 70–110)

## 2024-01-22 PROCEDURE — 25000242 PHARM REV CODE 250 ALT 637 W/ HCPCS: Performed by: STUDENT IN AN ORGANIZED HEALTH CARE EDUCATION/TRAINING PROGRAM

## 2024-01-22 PROCEDURE — 94761 N-INVAS EAR/PLS OXIMETRY MLT: CPT

## 2024-01-22 PROCEDURE — 94640 AIRWAY INHALATION TREATMENT: CPT

## 2024-01-22 PROCEDURE — 25000003 PHARM REV CODE 250: Performed by: STUDENT IN AN ORGANIZED HEALTH CARE EDUCATION/TRAINING PROGRAM

## 2024-01-22 PROCEDURE — 99900035 HC TECH TIME PER 15 MIN (STAT)

## 2024-01-22 RX ORDER — ACETAMINOPHEN 500 MG
500 TABLET ORAL EVERY 6 HOURS PRN
Refills: 0 | COMMUNITY
Start: 2024-01-22

## 2024-01-22 RX ORDER — GABAPENTIN 300 MG/1
300 CAPSULE ORAL 3 TIMES DAILY
Qty: 90 CAPSULE | Refills: 11 | Status: SHIPPED | OUTPATIENT
Start: 2024-01-22 | End: 2025-01-21

## 2024-01-22 RX ORDER — OXYCODONE HYDROCHLORIDE 5 MG/1
5 TABLET ORAL EVERY 4 HOURS PRN
Qty: 41 TABLET | Refills: 0 | Status: SHIPPED | OUTPATIENT
Start: 2024-01-22

## 2024-01-22 RX ORDER — METHOCARBAMOL 500 MG/1
500 TABLET, FILM COATED ORAL 4 TIMES DAILY
Qty: 40 TABLET | Refills: 0 | Status: SHIPPED | OUTPATIENT
Start: 2024-01-22 | End: 2024-02-01

## 2024-01-22 RX ADMIN — GABAPENTIN 300 MG: 300 CAPSULE ORAL at 09:01

## 2024-01-22 RX ADMIN — PENTOXIFYLLINE 400 MG: 400 TABLET, EXTENDED RELEASE ORAL at 12:01

## 2024-01-22 RX ADMIN — BUPROPION HYDROCHLORIDE 150 MG: 150 TABLET, FILM COATED, EXTENDED RELEASE ORAL at 09:01

## 2024-01-22 RX ADMIN — ATORVASTATIN CALCIUM 40 MG: 40 TABLET, FILM COATED ORAL at 09:01

## 2024-01-22 RX ADMIN — IPRATROPIUM BROMIDE AND ALBUTEROL SULFATE 3 ML: .5; 3 SOLUTION RESPIRATORY (INHALATION) at 08:01

## 2024-01-22 RX ADMIN — ACETAMINOPHEN 1000 MG: 500 TABLET ORAL at 07:01

## 2024-01-22 RX ADMIN — LIDOCAINE 1 PATCH: 700 PATCH TOPICAL at 12:01

## 2024-01-22 RX ADMIN — PENTOXIFYLLINE 400 MG: 400 TABLET, EXTENDED RELEASE ORAL at 09:01

## 2024-01-22 RX ADMIN — METHOCARBAMOL 500 MG: 500 TABLET ORAL at 12:01

## 2024-01-22 RX ADMIN — METHOCARBAMOL 500 MG: 500 TABLET ORAL at 09:01

## 2024-01-22 RX ADMIN — SENNOSIDES AND DOCUSATE SODIUM 1 TABLET: 8.6; 5 TABLET ORAL at 09:01

## 2024-01-22 RX ADMIN — ASPIRIN 81 MG: 81 TABLET, COATED ORAL at 07:01

## 2024-01-22 RX ADMIN — IPRATROPIUM BROMIDE AND ALBUTEROL SULFATE 3 ML: .5; 3 SOLUTION RESPIRATORY (INHALATION) at 12:01

## 2024-01-22 RX ADMIN — INSULIN ASPART 6 UNITS: 100 INJECTION, SOLUTION INTRAVENOUS; SUBCUTANEOUS at 12:01

## 2024-01-22 NOTE — PLAN OF CARE
CHW met with patient/family at bedside. Patient experience rounding completed and reviewed the following.     Do you know your discharge plan? Yes or No,    If yes, what is the plan? (Home, Home Health, Rehab, SNF, LTAC, or Other)   Home    Have you discussed your needs and preferences with your SW/CM? Yes or No    Yes    If you are discharging home, do you have help at home? Yes or No    Yes    Do you think you will need help additional at home at discharge? Yes or No    No    Do you currently have difficulty keeping up with bills, affording medicine or buying food? Yes or No       No    Assigned SW/CM notified of any patient/family needs or concerns. Appropriate resources provided to address patient's needs.  Bolivar Lyn CHW  Case Management   [phone]

## 2024-01-22 NOTE — PLAN OF CARE
Problem: Fall Injury Risk  Goal: Absence of Fall and Fall-Related Injury  Outcome: Ongoing, Progressing     Problem: Diabetes Comorbidity  Goal: Blood Glucose Level Within Targeted Range  Outcome: Ongoing, Progressing    Chest tube remains in placed and clamped off. PRN oxy given once on shift.  Ambulates in greer w/o discomfort/distress.

## 2024-01-22 NOTE — PLAN OF CARE
[facility] Plan of Care Note  Dx Squamous Cell Carcinoma of lower lobe of right lung    Shift Events none    Goals of Care: pain management, monitor signs/symptoms of patient w/clamped chest tube    Neuro: AAO X 4    Vital Signs: VSS    Respiratory: RA    Diet: Diabetic Diet    Is patient tolerating current diet? yes    GTTS: none    Urine Output/Bowel Movement: adequate urine output     Drains/Tubes/Tube Feeds (include total output/shift): right side chest tube, clamped    Lines: Right FA saline locked      Accuchecks: ACHS    Skin: R chest side chest tube clamped with redness around incision site/lap sites     Fall Risk Score: ***    Activity level? independent    Any scheduled procedures? none    Any safety concerns? Fall precautions    Other: none    Problem: Adult Inpatient Plan of Care  Goal: Plan of Care Review  Outcome: Ongoing, Progressing  Goal: Patient-Specific Goal (Individualized)  Outcome: Ongoing, Progressing  Goal: Absence of Hospital-Acquired Illness or Injury  Outcome: Ongoing, Progressing  Goal: Optimal Comfort and Wellbeing  Outcome: Ongoing, Progressing  Goal: Readiness for Transition of Care  Outcome: Ongoing, Progressing     Problem: Fall Injury Risk  Goal: Absence of Fall and Fall-Related Injury  Outcome: Ongoing, Progressing     Problem: Diabetes Comorbidity  Goal: Blood Glucose Level Within Targeted Range  Outcome: Ongoing, Progressing

## 2024-01-22 NOTE — DISCHARGE SUMMARY
[facility]riel lon [facility]  General Surgery  Discharge Summary      Patient Name: Malik Gardiner  MRN: [id number]  Admission Date: 1/18/2024  Hospital Length of Stay: 4 days  Discharge Date and Time:  01/22/2024 11:00  Attending Physician: No att. providers found   Discharging Provider: Luzma Solomon PA-C  Primary Care Provider: Tio Sullivan MD     HPI:   Patient is a 68 y.o. male smoker with DM on insulin, COPD, melanoma and PAD (angioplasty w/o stent) here today for RLL squamous cell carcinoma. Admitted to [facility] with food poisoning and during work-up underwent CT abdomen which identified lung nodule measuring 1.2 cm previously 0.5cm on CT Jan 2023. Referred to pulmonary. Repeat CT 12/1/23 with interval progression to 1.5 cm RLL nodule. No other nodules or adenopathy. Bronchoscopy 12/11/23: RLL squamous cell carcinoma. 4L and 7 negative for malignancy. PET with RLL nodule SUV 6.1, no mediastinal or hilar avidity. Chronic cough with phlegm. Claudication ~ 50 yds. Occasional resting leg pain. Not on blood thinners. On pentoxifylline.      Smoker. 2-3ppd smoker however down to 1.5 ppd.   PSH: jaw reconstruction with plates - tracheostomy with decannulation within month of placement, excision of melanoma R shoulder, appendectomy, laryngeal vs VC mass biopsy      Procedure(s) (LRB):  XI ROBOTIC RIGHT LOWER LOBECTOMY,LUNG (Right)  LYMPHADENECTOMY (Right)  BLOCK, NERVE, INTERCOSTAL, 2 OR MORE     Hospital Course:   Patient admitted following the above procedure which he tolerated well. Clamp trial on POD 2 with increase in SUb q air. No increase in PTX. Chest tube removed 4 and subsequently discharged. Pain controlled on PO meds. Ambulating in halls. Having BM. Voiding spontaneously.     Consults:     Significant Diagnostic Studies: Radiology: X-Ray: CXR: X-Ray Chest 1 View (CXR):   Results for orders placed or performed during the hospital encounter of 01/18/24   X-Ray Chest AP Single View    Narrative     EXAMINATION:  XR CHEST 1 VIEW    CLINICAL HISTORY:  s/p right lower lobectomy;.    TECHNIQUE:  Single frontal portable view of the chest was performed.    COMPARISON:  January 20, 2024 at 16:05    FINDINGS:  Support devices: Right chest tubes and surgical changes remain.    There may be a small amount of air in the pleural space at the right lung base.  Otherwise, there has not been any detrimental change since January 20, 2024 at 16:05.      Impression    Possible small right basilar pneumothorax with no other interval change since January 20, 2024 at 16:05.    This report was flagged in Epic as abnormal.      Electronically signed by: Ketty Granados MD  Date:    01/21/2024  Time:    09:17       Pending Diagnostic Studies:       Procedure Component Value Units Date/Time    Specimen to Pathology, Surgery Pulmonary and Thoracic [[id number]] Collected: 01/18/24 1113    Order Status: Sent Lab Status: In process Updated: 01/18/24 1619    Specimen: Tissue           Final Active Diagnoses:    Diagnosis Date Noted POA    PRINCIPAL PROBLEM:  Squamous cell carcinoma of lower lobe of right lung [C34.31] 12/26/2023 Yes    Type 2 diabetes mellitus, with long-term current use of insulin [E11.9, Z79.4] 01/19/2024 Not Applicable      Problems Resolved During this Admission:      Discharged Condition: good    Disposition: Home or Self Care    Follow Up:   Follow-up Information       Renny Rowe MD Follow up on 2/6/2024.    Specialty: Thoracic Diseases  Contact information:  [address] [place] [address]  [phone]                           Patient Instructions:      Diet diabetic     Remove dressing in 48 hours     Notify your health care provider if you experience any of the following:  increased confusion or weakness     Notify your health care provider if you experience any of the following:  persistent dizziness, light-headedness, or visual disturbances     Notify your health care provider if you  experience any of the following:  worsening rash     Notify your health care provider if you experience any of the following:  severe persistent headache     Notify your health care provider if you experience any of the following:  difficulty breathing or increased cough     Notify your health care provider if you experience any of the following:  redness, tenderness, or signs of infection (pain, swelling, redness, odor or green/yellow discharge around incision site)     Notify your health care provider if you experience any of the following:  severe uncontrolled pain     Notify your health care provider if you experience any of the following:  persistent nausea and vomiting or diarrhea     Notify your health care provider if you experience any of the following:  temperature >100.4     Activity as tolerated     Shower on day dressing removed (No bath)     Medications:  Reconciled Home Medications:      Medication List        START taking these medications      acetaminophen 500 MG tablet  Commonly known as: TYLENOL  Take 1 tablet (500 mg total) by mouth every 6 (six) hours as needed for Pain.     gabapentin 300 MG capsule  Commonly known as: NEURONTIN  Take 1 capsule (300 mg total) by mouth 3 (three) times daily.     methocarbamoL 500 MG Tab  Commonly known as: ROBAXIN  Take 1 tablet (500 mg total) by mouth 4 (four) times daily. for 10 days     oxyCODONE 5 MG immediate release tablet  Commonly known as: ROXICODONE  Take 1 tablet (5 mg total) by mouth every 4 (four) hours as needed for Pain.            CONTINUE taking these medications      albuterol 90 mcg/actuation inhaler  Commonly known as: PROVENTIL/VENTOLIN HFA  INHALE 2 INHALATIONS BY ORAL INHALATION FOUR TIMES A DAY AS NEEDED     aspirin 81 MG EC tablet  Commonly known as: ECOTRIN  Take 1 tablet by mouth every morning.     buPROPion 150 MG TBSR 12 hr tablet  Commonly known as: WELLBUTRIN SR  150 mg.     EScitalopram oxalate 20 MG tablet  Commonly known as:  "LEXAPRO  Take 1 tablet by mouth every evening.     fluticasone propionate 50 mcg/actuation nasal spray  Commonly known as: FLONASE  1 spray by Nasal route daily as needed.     JANUMET 50-1,000 mg per tablet  Generic drug: SITagliptan-metformin  Take 1 tablet by mouth 2 (two) times daily with meals.     LANTUS SOLOSTAR U-100 INSULIN glargine 100 units/mL SubQ pen  Generic drug: insulin  Inject 20 Units into the skin every evening.     lisinopriL 5 MG tablet  Commonly known as: PRINIVIL,ZESTRIL  Take 1 tablet by mouth once daily.     pen needle, diabetic 32 gauge x 5/32" Ndle  Inject 2 each into the skin.     pentoxifylline 400 mg Tbsr  Commonly known as: TRENTAL  Take 1 tablet by mouth 3 (three) times daily with meals.     rosuvastatin 10 MG tablet  Commonly known as: CRESTOR  Take 1 tablet by mouth every evening.     STIOLTO RESPIMAT 2.5-2.5 mcg/actuation Mist  Generic drug: tiotropium-olodateroL  Inhale 1 puff into the lungs once daily. Controller     VICTOZA 2-LADARIUS 0.6 mg/0.1 mL (18 mg/3 mL) Pnij pen  Generic drug: liraglutide 0.6 mg/0.1 mL (18 mg/3 mL) subq PNIJ  INJECT 0.6 MG INTO THE SKIN IN THE MORNING.              Luzma Solomon PA-C  General Surgery  [facility]  "

## 2024-01-22 NOTE — ASSESSMENT & PLAN NOTE
POD2 s/p robotic right lower lobectomy with MLND.    - likely tube out this morning. Home this evening   - Wean O2; goal O2 saturation >= 88%  - Multimodal pain management  - Daily CXR  - DVT ppx  - OOB, ambulate as tolerated  - Diabetic diet

## 2024-01-22 NOTE — SUBJECTIVE & OBJECTIVE
Interval History: no air leak this morning. RA. Pain controlled    Medications:  Continuous Infusions:  Scheduled Meds:   acetaminophen  1,000 mg Oral Q8H    albuterol-ipratropium  3 mL Nebulization Q6H    aspirin  81 mg Oral QAM    atorvastatin  40 mg Oral Daily    buPROPion  150 mg Oral Daily    enoxparin  40 mg Subcutaneous Q24H (prophylaxis, 1700)    EScitalopram oxalate  20 mg Oral QHS    gabapentin  300 mg Oral TID    insulin detemir U-100  20 Units Subcutaneous QHS    LIDOcaine  1 patch Transdermal Q24H    methocarbamoL  500 mg Oral QID    pentoxifylline  400 mg Oral TID WM    polyethylene glycol  17 g Oral Daily    senna-docusate 8.6-50 mg  1 tablet Oral BID     PRN Meds:bisacodyL, dextrose 10%, dextrose 10%, glucagon (human recombinant), glucose, glucose, insulin aspart U-100, ondansetron, oxyCODONE, oxyCODONE, sodium chloride 0.9%     Review of patient's allergies indicates:  No Known Allergies  Objective:     Vital Signs (Most Recent):  Temp: 98.2 °F (36.8 °C) (01/22/24 0712)  Pulse: 78 (01/22/24 0803)  Resp: 16 (01/22/24 0803)  BP: (!) 149/71 (01/22/24 0712)  SpO2: 95 % (01/22/24 0803) Vital Signs (24h Range):  Temp:  [97.9 °F (36.6 °C)-98.5 °F (36.9 °C)] 98.2 °F (36.8 °C)  Pulse:  [71-84] 78  Resp:  [16-18] 16  SpO2:  [92 %-99 %] 95 %  BP: (126-149)/(60-77) 149/71     Intake/Output - Last 3 Shifts         01/20 0700 01/21 0659 01/21 0700 01/22 0659 01/22 0700 01/23 0659    P.O. 480 600     Total Intake(mL/kg) 480 (5.8) 600 (7.2)     Urine (mL/kg/hr) 1300 (0.7) 2350 (1.2)     Stool  0     Chest Tube 110      Total Output 1410 2350     Net -930 -1750            Stool Occurrence  2 x             SpO2: 95 %        Physical Exam  Constitutional:       Appearance: Normal appearance. He is normal weight.   Eyes:      Extraocular Movements: Extraocular movements intact.   Cardiovascular:      Rate and Rhythm: Normal rate and regular rhythm.      Pulses: Normal pulses.   Pulmonary:      Effort: Pulmonary  "effort is normal.      Breath sounds: Normal breath sounds.      Comments: Chest tube in place on right side. No air leak.   Right chest wall subq ephysema, improved from last night  Abdominal:      General: Abdomen is flat.      Palpations: Abdomen is soft.   Skin:     General: Skin is warm and dry.      Comments: Incisions c/d/i   Neurological:      General: No focal deficit present.      Mental Status: He is alert and oriented to person, place, and time. Mental status is at baseline.   Psychiatric:         Mood and Affect: Mood normal.         Behavior: Behavior normal.         Thought Content: Thought content normal.            Significant Labs:  BMP: No results for input(s): "GLU", "NA", "K", "CL", "CO2", "BUN", "CREATININE", "CALCIUM", "MG" in the last 48 hours.  CBC: No results for input(s): "WBC", "RBC", "HGB", "HCT", "PLT", "MCV", "MCH", "MCHC" in the last 48 hours.    Significant Diagnostics:  CXR: I have reviewed all pertinent results/findings within the past 24 hours    VTE Risk Mitigation (From admission, onward)           Ordered     enoxaparin injection 40 mg  Every 24 hours         01/18/24 1652     Place DARLINE hose  Until discontinued         01/18/24 1208     Place sequential compression device  Until discontinued         01/18/24 1208                  "

## 2024-01-22 NOTE — PLAN OF CARE
Pt discharge home with family.  Discharge instructions given with teach back.  PIVs removed.  Meds delivered bedside.

## 2024-01-22 NOTE — PROGRESS NOTES
[facility]  Thoracic Surgery  Progress Note    Subjective:     History of Present Illness:  Patient is a 68 y.o. male smoker with DM on insulin, COPD, melanoma and PAD (angioplasty w/o stent) here today for RLL squamous cell carcinoma. Admitted to [facility] with food poisoning and during work-up underwent CT abdomen which identified lung nodule measuring 1.2 cm previously 0.5cm on CT Jan 2023. Referred to pulmonary. Repeat CT 12/1/23 with interval progression to 1.5 cm RLL nodule. No other nodules or adenopathy. Bronchoscopy 12/11/23: RLL squamous cell carcinoma. 4L and 7 negative for malignancy. PET with RLL nodule SUV 6.1, no mediastinal or hilar avidity. Chronic cough with phlegm. Claudication ~ 50 yds. Occasional resting leg pain. Not on blood thinners. On pentoxifylline.      Smoker. 2-3ppd smoker however down to 1.5 ppd.   PSH: jaw reconstruction with plates - tracheostomy with decannulation within month of placement, excision of melanoma R shoulder, appendectomy, laryngeal vs VC mass biopsy       Good neck extension.   No issues with placement of 9-0 ETT. Good view of cords.     Post-Op Info:  Procedure(s) (LRB):  XI ROBOTIC RIGHT LOWER LOBECTOMY,LUNG (Right)  LYMPHADENECTOMY (Right)  BLOCK, NERVE, INTERCOSTAL, 2 OR MORE   4 Days Post-Op     Interval History: no air leak this morning. RA. Pain controlled    Medications:  Continuous Infusions:  Scheduled Meds:   acetaminophen  1,000 mg Oral Q8H    albuterol-ipratropium  3 mL Nebulization Q6H    aspirin  81 mg Oral QAM    atorvastatin  40 mg Oral Daily    buPROPion  150 mg Oral Daily    enoxparin  40 mg Subcutaneous Q24H (prophylaxis, 1700)    EScitalopram oxalate  20 mg Oral QHS    gabapentin  300 mg Oral TID    insulin detemir U-100  20 Units Subcutaneous QHS    LIDOcaine  1 patch Transdermal Q24H    methocarbamoL  500 mg Oral QID    pentoxifylline  400 mg Oral TID WM    polyethylene glycol  17 g Oral Daily    senna-docusate 8.6-50 mg  1 tablet Oral BID      PRN Meds:bisacodyL, dextrose 10%, dextrose 10%, glucagon (human recombinant), glucose, glucose, insulin aspart U-100, ondansetron, oxyCODONE, oxyCODONE, sodium chloride 0.9%     Review of patient's allergies indicates:  No Known Allergies  Objective:     Vital Signs (Most Recent):  Temp: 98.2 °F (36.8 °C) (01/22/24 0712)  Pulse: 78 (01/22/24 0803)  Resp: 16 (01/22/24 0803)  BP: (!) 149/71 (01/22/24 0712)  SpO2: 95 % (01/22/24 0803) Vital Signs (24h Range):  Temp:  [97.9 °F (36.6 °C)-98.5 °F (36.9 °C)] 98.2 °F (36.8 °C)  Pulse:  [71-84] 78  Resp:  [16-18] 16  SpO2:  [92 %-99 %] 95 %  BP: (126-149)/(60-77) 149/71     Intake/Output - Last 3 Shifts         01/20 0700  01/21 0659 01/21 0700  01/22 0659 01/22 0700  01/23 0659    P.O. 480 600     Total Intake(mL/kg) 480 (5.8) 600 (7.2)     Urine (mL/kg/hr) 1300 (0.7) 2350 (1.2)     Stool  0     Chest Tube 110      Total Output 1410 2350     Net -930 -1750            Stool Occurrence  2 x             SpO2: 95 %        Physical Exam  Constitutional:       Appearance: Normal appearance. He is normal weight.   Eyes:      Extraocular Movements: Extraocular movements intact.   Cardiovascular:      Rate and Rhythm: Normal rate and regular rhythm.      Pulses: Normal pulses.   Pulmonary:      Effort: Pulmonary effort is normal.      Breath sounds: Normal breath sounds.      Comments: Chest tube in place on right side. No air leak.   Right chest wall subq ephysema, improved from last night  Abdominal:      General: Abdomen is flat.      Palpations: Abdomen is soft.   Skin:     General: Skin is warm and dry.      Comments: Incisions c/d/i   Neurological:      General: No focal deficit present.      Mental Status: He is alert and oriented to person, place, and time. Mental status is at baseline.   Psychiatric:         Mood and Affect: Mood normal.         Behavior: Behavior normal.         Thought Content: Thought content normal.            Significant Labs:  BMP: No results for  "input(s): "GLU", "NA", "K", "CL", "CO2", "BUN", "CREATININE", "CALCIUM", "MG" in the last 48 hours.  CBC: No results for input(s): "WBC", "RBC", "HGB", "HCT", "PLT", "MCV", "MCH", "MCHC" in the last 48 hours.    Significant Diagnostics:  CXR: I have reviewed all pertinent results/findings within the past 24 hours    VTE Risk Mitigation (From admission, onward)           Ordered     enoxaparin injection 40 mg  Every 24 hours         01/18/24 1652     Place DARLINE hose  Until discontinued         01/18/24 1208     Place sequential compression device  Until discontinued         01/18/24 1208                  Assessment/Plan:     * Squamous cell carcinoma of lower lobe of right lung  POD2 s/p robotic right lower lobectomy with MLND.    - likely tube out this morning. Home this evening   - Wean O2; goal O2 saturation >= 88%  - Multimodal pain management  - Daily CXR  - DVT ppx  - OOB, ambulate as tolerated  - Diabetic diet         Luzma Solomon PA-C  Thoracic Surgery  [facility]  "

## 2024-01-23 LAB
FINAL PATHOLOGIC DIAGNOSIS: NORMAL
GROSS: NORMAL
Lab: NORMAL

## 2024-01-23 NOTE — PLAN OF CARE
[facility]  Discharge Final Note    Primary Care Provider: Tio Sullivan MD  Expected Discharge Date: 1/22/2024    Patient medically ready for discharge to home.     Transportation by family.     Is family/patient aware of discharge: yes     Hospital follow up scheduled: yes       Final Discharge Note (most recent)       Final Note - 01/23/24 0953          Final Note    Assessment Type Final Discharge Note     Anticipated Discharge Disposition Home or Self Care     Hospital Resources/Appts/Education Provided Provided patient/caregiver with written discharge plan information                     Important Message from Medicare  Important Message from Medicare regarding Discharge Appeal Rights: Given to patient/caregiver, Explained to patient/caregiver, Signed/date by patient/caregiver   Date IMM was signed: 01/22/24  Time IMM was signed: 1150  Referral Info (most recent)       Referral Info    No documentation.                 Contact Info       Renny Rowe MD   Specialty: Thoracic Diseases    [address]  [facility] [address]   Phone: [phone]       Next Steps: Follow up on 2/6/2024          Future Appointments   Date Time Provider Department Center   1/30/2024  2:00 PM Malik Ho MD [facility] NLPUL MBP   2/6/2024  9:00 AM [facility] XR4 TO 2ND FLOOR XR [facility] XRAY2 [place]   2/6/2024 11:00 AM Renny Rowe MD [facility] LUNG [facility] at [facility]   1/13/2025 11:00 AM Aaron Lemos MD [facility] CARD [facility] Card     Bolivar Cooper RN  Case Management  Ext: [phone]  01/23/2024

## 2024-01-23 NOTE — PHYSICIAN QUERY
PT Name: Malik Gardiner  MR #: [id number]    DOCUMENTATION CLARIFICATION     CDS/: Ashok Patten Jr, RN CCDS              Contact information:[email or web address]      This form is a permanent document in the medical record.     Query Date: January 23, 2024    Dear Provider,  By submitting this query, we are merely seeking further clarification of documentation. Please utilize your independent clinical judgment when addressing the question(s) below.    The medical record contains the following:  Supporting Clinical Findings Location in Medical Record     Procedure:  Robotic right lower lobectomy, mediastinal lymph node dissection, intercostal nerve block 2 or more levels      FINDINGS:  Postoperative changes from right thoracotomy, apparently lower lobectomy, are now seen with clips in the chest wall and placement of a chest tube with tip in the middle lung zone medially.  The heart size is normal.  Mediastinal structures are a little shifted to the right.  Lungs are expanded without definite pneumothorax.  Minor atelectasis is present in bilateral lung bases.  No significant airspace consolidation or pleural fluid is detected.  Skeletal structures show no acute finding.     Impression:     Status post right thoracotomy      FINDINGS:  No significant detrimental interval change in the appearance of the chest since 01/18/2024 12:59 is appreciated.  Subcutaneous emphysema along the lower right lateral thoracic wall is noted, but no pneumothorax of any appreciable volume is seen.     Impression:     As above      FINDINGS:  Cardiomediastinal silhouettewithin normal limits for age.     Postoperative changes in the right hemithorax in keeping with right lobectomy.  Inferomedially coursing right-sided pleural drain remains in place.  There is some subsegmental atelectasis in the right lung base with no convincing evidence of pneumothorax.  Right chest wall subcutaneous emphysema is increased from prior  significantly and surgical chest wall skin staples remain.     Left lung remains clear.     Impression:     Postoperative changes in the right hemithorax without complicating features, noting an increase in right chest wall and supraclavicular subcutaneous emphysema.    Clamp trial on POD 2 with increase in SUb q air. No increase in PTX. Chest tube removed 4 and subsequently discharged.    1/18 Op Note        1/18 Chest X Ray                          1/19 Chest X Ray                      1/20 Chest X Ray                                1/22 Discharge Summary         Please clarify/confirm the              PTX         Diagnosis and its relationship to the          Lobectomy              [X  ] Pneumothorax Not Present    [  ] Pneumothorax Present, but not a complication of the procedure   [  ] Pneumothorax Present and Complication of the procedure   [  ] Other clarification of findings(please specify): __________________   [  ] Clinically Undetermined       Please document in your progress notes daily for the duration of treatment until resolved and include in your discharge summary.

## 2024-01-31 NOTE — PROGRESS NOTES
"Subjective     Patient ID: Malik Gardiner is a 68 y.o. male.    Chief Complaint: Post-op Evaluation    Diagnosis:  NSCLC    Pre-operative therapy: none    Procedure(s) and date(s): 1/18/24: right robotic assisted lower lobectomy with MLND    Pathology: 2.1 cm moderately differentiated keratinizing squamous cell carcinoma. Margins negative. Levels 2,4,7,8,11 = negative (0/24). pT1cN0     Post-operative therapy:   Patient is a 68 y.o. male smoker with DM on insulin, COPD, melanoma and PAD (angioplasty w/o stent) here today for RLL squamous cell carcinoma.     HPI  Patient is a 68 y.o. male smoker with DM on insulin, COPD, melanoma and PAD (angioplasty w/o stent) here today for RLL squamous cell carcinoma here today for 2 week follow-up. Uncomplicated post-operative course. Tolerated surgery well. No breathing complaints. Pain improving. Residual hypersensitivity wrapping around right lower chest wall. Seen by Dr. Ho post op. Off all medications. 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     Review of Systems   Constitutional:  Negative for activity change, appetite change and fatigue.   Respiratory:  Negative for cough, chest tightness and shortness of breath.    Cardiovascular:  Negative for chest pain.   Gastrointestinal:  Negative for abdominal pain.   Musculoskeletal:  Negative for arthralgias.   Neurological:  Negative for dizziness and syncope.   Psychiatric/Behavioral:  Negative for agitation. The patient is not nervous/anxious.           Objective     Vitals:    02/06/24 0856   BP: 137/71   Pulse: 68   Resp: 16   Temp: 97.2 °F (36.2 °C)   TempSrc: Temporal   SpO2: 97%   Weight: 81.8 kg (180 lb 5.4 oz)   Height: 6' 1" (1.854 m)   PainSc:   1         Physical Exam  Constitutional:       Appearance: Normal appearance.   Cardiovascular:      Rate and Rhythm: Normal rate and regular rhythm.   Pulmonary:      Effort: Pulmonary effort is normal.      Breath sounds: Normal breath sounds.      Comments: Robotic " incisions healing. No drainage or erythema.   Musculoskeletal:      Cervical back: Normal range of motion.   Neurological:      Mental Status: He is alert.     CXR 2/6/24:  Evolving postsurgical changes as discussed above without evidence of any pneumothorax or other acute process.        Assessment and Plan   Patient is a 68 y.o. male smoker with DM on insulin, COPD, melanoma and PAD (angioplasty w/o stent) here today for RLL squamous cell carcinoma. Pathology pT1cN0, stage IA NSCLC.   No indication for adjuvant therapy  Neuropathic pain - hypersensitivity   Doing well clinically.     If neuropathic pain is persistent, restart gabapentin 300mg qHS.   Message with questions.   Otherwise, RTC in 6 months with chest CT.

## 2024-02-06 ENCOUNTER — DOCUMENTATION ONLY (OUTPATIENT)
Dept: HEMATOLOGY/ONCOLOGY | Facility: CLINIC | Age: 69
End: 2024-02-06
Payer: MEDICARE

## 2024-02-06 ENCOUNTER — OFFICE VISIT (OUTPATIENT)
Dept: PULMONOLOGY | Facility: CLINIC | Age: 69
End: 2024-02-06
Payer: MEDICARE

## 2024-02-06 ENCOUNTER — HOSPITAL ENCOUNTER (OUTPATIENT)
Dept: RADIOLOGY | Facility: HOSPITAL | Age: 69
Discharge: HOME | End: 2024-02-06
Attending: PHYSICIAN ASSISTANT
Payer: MEDICARE

## 2024-02-06 VITALS
HEIGHT: 73 IN | OXYGEN SATURATION: 97 % | RESPIRATION RATE: 16 BRPM | SYSTOLIC BLOOD PRESSURE: 137 MMHG | TEMPERATURE: 97 F | DIASTOLIC BLOOD PRESSURE: 71 MMHG | WEIGHT: 180.31 LBS | BODY MASS INDEX: 23.9 KG/M2 | HEART RATE: 68 BPM

## 2024-02-06 DIAGNOSIS — C34.90 MALIGNANT NEOPLASM OF UNSPECIFIED PART OF UNSPECIFIED BRONCHUS OR LUNG: Primary | ICD-10-CM

## 2024-02-06 DIAGNOSIS — C34.31 SQUAMOUS CELL CARCINOMA OF LOWER LOBE OF RIGHT LUNG: ICD-10-CM

## 2024-02-06 DIAGNOSIS — C34.31 MALIGNANT NEOPLASM OF LOWER LOBE OF RIGHT LUNG: Primary | ICD-10-CM

## 2024-02-06 PROCEDURE — 3075F SYST BP GE 130 - 139MM HG: CPT | Mod: CPTII,S$GLB,, | Performed by: THORACIC SURGERY (CARDIOTHORACIC VASCULAR SURGERY)

## 2024-02-06 PROCEDURE — 99024 POSTOP FOLLOW-UP VISIT: CPT | Mod: S$GLB,,, | Performed by: THORACIC SURGERY (CARDIOTHORACIC VASCULAR SURGERY)

## 2024-02-06 PROCEDURE — 71046 X-RAY EXAM CHEST 2 VIEWS: CPT | Mod: TC,FY,PO

## 2024-02-06 PROCEDURE — 99999 PR PBB SHADOW E&M-EST. PATIENT-LVL IV: CPT | Mod: PBBFAC,,, | Performed by: THORACIC SURGERY (CARDIOTHORACIC VASCULAR SURGERY)

## 2024-02-06 PROCEDURE — 1125F AMNT PAIN NOTED PAIN PRSNT: CPT | Mod: CPTII,S$GLB,, | Performed by: THORACIC SURGERY (CARDIOTHORACIC VASCULAR SURGERY)

## 2024-02-06 PROCEDURE — 1159F MED LIST DOCD IN RCRD: CPT | Mod: CPTII,S$GLB,, | Performed by: THORACIC SURGERY (CARDIOTHORACIC VASCULAR SURGERY)

## 2024-02-06 PROCEDURE — 71046 X-RAY EXAM CHEST 2 VIEWS: CPT | Mod: 26,,, | Performed by: RADIOLOGY

## 2024-02-06 PROCEDURE — 3288F FALL RISK ASSESSMENT DOCD: CPT | Mod: CPTII,S$GLB,, | Performed by: THORACIC SURGERY (CARDIOTHORACIC VASCULAR SURGERY)

## 2024-02-06 PROCEDURE — 3078F DIAST BP <80 MM HG: CPT | Mod: CPTII,S$GLB,, | Performed by: THORACIC SURGERY (CARDIOTHORACIC VASCULAR SURGERY)

## 2024-02-06 PROCEDURE — 1101F PT FALLS ASSESS-DOCD LE1/YR: CPT | Mod: CPTII,S$GLB,, | Performed by: THORACIC SURGERY (CARDIOTHORACIC VASCULAR SURGERY)

## 2024-02-06 NOTE — NURSING
Patient seen in lung clinic by Dr. Rowe. Plan is to f/u in 6 months with Non-Contrast CT Chest. Future appointments scheduled and provided to patient.

## 2024-03-07 ENCOUNTER — PATIENT MESSAGE (OUTPATIENT)
Dept: RESEARCH | Facility: HOSPITAL | Age: 69
End: 2024-03-07
Payer: MEDICARE

## 2024-08-06 ENCOUNTER — OFFICE VISIT (OUTPATIENT)
Dept: PULMONOLOGY | Facility: CLINIC | Age: 69
End: 2024-08-06
Payer: MEDICARE

## 2024-08-06 ENCOUNTER — TELEPHONE (OUTPATIENT)
Dept: HEMATOLOGY/ONCOLOGY | Facility: CLINIC | Age: 69
End: 2024-08-06
Payer: MEDICARE

## 2024-08-06 VITALS
OXYGEN SATURATION: 96 % | HEART RATE: 59 BPM | HEIGHT: 73 IN | DIASTOLIC BLOOD PRESSURE: 70 MMHG | WEIGHT: 195.13 LBS | TEMPERATURE: 98 F | SYSTOLIC BLOOD PRESSURE: 120 MMHG | RESPIRATION RATE: 17 BRPM | BODY MASS INDEX: 25.86 KG/M2

## 2024-08-06 DIAGNOSIS — C34.31 MALIGNANT NEOPLASM OF LOWER LOBE OF RIGHT LUNG: Primary | ICD-10-CM

## 2024-08-06 PROCEDURE — 1125F AMNT PAIN NOTED PAIN PRSNT: CPT | Mod: CPTII,S$GLB,, | Performed by: THORACIC SURGERY (CARDIOTHORACIC VASCULAR SURGERY)

## 2024-08-06 PROCEDURE — 1101F PT FALLS ASSESS-DOCD LE1/YR: CPT | Mod: CPTII,S$GLB,, | Performed by: THORACIC SURGERY (CARDIOTHORACIC VASCULAR SURGERY)

## 2024-08-06 PROCEDURE — 3078F DIAST BP <80 MM HG: CPT | Mod: CPTII,S$GLB,, | Performed by: THORACIC SURGERY (CARDIOTHORACIC VASCULAR SURGERY)

## 2024-08-06 PROCEDURE — 99024 POSTOP FOLLOW-UP VISIT: CPT | Mod: S$GLB,,, | Performed by: THORACIC SURGERY (CARDIOTHORACIC VASCULAR SURGERY)

## 2024-08-06 PROCEDURE — 3288F FALL RISK ASSESSMENT DOCD: CPT | Mod: CPTII,S$GLB,, | Performed by: THORACIC SURGERY (CARDIOTHORACIC VASCULAR SURGERY)

## 2024-08-06 PROCEDURE — 3074F SYST BP LT 130 MM HG: CPT | Mod: CPTII,S$GLB,, | Performed by: THORACIC SURGERY (CARDIOTHORACIC VASCULAR SURGERY)

## 2024-08-06 PROCEDURE — 1159F MED LIST DOCD IN RCRD: CPT | Mod: CPTII,S$GLB,, | Performed by: THORACIC SURGERY (CARDIOTHORACIC VASCULAR SURGERY)

## 2024-08-06 PROCEDURE — 99999 PR PBB SHADOW E&M-EST. PATIENT-LVL IV: CPT | Mod: PBBFAC,,, | Performed by: THORACIC SURGERY (CARDIOTHORACIC VASCULAR SURGERY)

## 2024-08-06 PROCEDURE — 3046F HEMOGLOBIN A1C LEVEL >9.0%: CPT | Mod: CPTII,S$GLB,, | Performed by: THORACIC SURGERY (CARDIOTHORACIC VASCULAR SURGERY)

## 2024-08-08 ENCOUNTER — TELEPHONE (OUTPATIENT)
Dept: HEMATOLOGY/ONCOLOGY | Facility: CLINIC | Age: 69
End: 2024-08-08
Payer: MEDICARE

## 2024-08-08 DIAGNOSIS — C34.90 MALIGNANT NEOPLASM OF UNSPECIFIED PART OF UNSPECIFIED BRONCHUS OR LUNG: Primary | ICD-10-CM

## 2024-12-02 NOTE — PROGRESS NOTES
"Subjective     Patient ID: Malik Gardiner is a 69 y.o. male.    Chief Complaint: Malignant neoplasm of lower lobe of right lung (4 month follow up with CT Scan)    Diagnosis:  NSCLC    Pre-operative therapy: none    Procedure(s) and date(s): 1/18/24: right robotic assisted lower lobectomy with MLND    Pathology: 2.1 cm moderately differentiated keratinizing squamous cell carcinoma. Margins negative. Levels 2,4,7,8,11 = negative (0/24). pT1cN0     Post-operative therapy: surveillance       HPI  Patient is a 68 y.o. male smoker with DM on insulin, COPD, melanoma and PAD (angioplasty w/o stent) here today for RLL squamous cell carcinoma here today for 6 month follow-up. Uncomplicated post-operative course. Tolerated surgery well. At last visit we noted soft tissue at the right lung base. Recent left fem-pop. Planning for right in the near future. Here for short interval follow-up. Doing well.     Review of Systems   Constitutional:  Negative for activity change, appetite change and fatigue.   Respiratory:  Negative for cough, chest tightness and shortness of breath.    Cardiovascular:  Negative for chest pain.   Gastrointestinal:  Negative for abdominal pain.   Musculoskeletal:  Negative for arthralgias.   Neurological:  Negative for syncope.   Psychiatric/Behavioral:  Negative for agitation. The patient is not nervous/anxious.         Objective     Vitals:    12/03/24 1123   BP: 122/66   Pulse: 75   Resp: 16   Temp: 97.2 °F (36.2 °C)   TempSrc: Temporal   SpO2: (!) 6%   Weight: 83.4 kg (183 lb 13.8 oz)   Height: 6' 1" (1.854 m)   PainSc:   4   PainLoc: Leg         Physical Exam  Constitutional:       Appearance: Normal appearance.   Eyes:      Extraocular Movements: Extraocular movements intact.   Cardiovascular:      Rate and Rhythm: Normal rate and regular rhythm.   Pulmonary:      Effort: Pulmonary effort is normal.      Breath sounds: Normal breath sounds.   Musculoskeletal:         General: Normal range of " motion.      Cervical back: Normal range of motion.      Left lower leg: Edema present.   Skin:     General: Skin is warm and dry.      Comments: Fresh, intact LLE incisions from recent L fem-pop bypass   Neurological:      General: No focal deficit present.      Mental Status: He is alert and oriented to person, place, and time.   Psychiatric:         Mood and Affect: Mood normal.         Behavior: Behavior normal.     CXR 2/6/24:  Evolving postsurgical changes as discussed above without evidence of any pneumothorax or other acute process.     Chest CT 8/6/24:  I reviewed the images  1. Status post right lower lobe resection without evidence for recurrent disease.  2. Concern for pulmonary nodule within the right lung base adjacent to the diaphragm.  Given size recommend PET/CT scan to further evaluate.  3. Scattered other pulmonary nodules are identified.  4. Other secondary findings as noted.    Chest CT 12/3/24: I reviewed the images  1. Stable postoperative changes of right lower lobectomy.  No evidence of new disease.  2. Grossly stable mild pleuroparenchymal and septal thickening throughout the right lung, nonspecific.  3. Few stable pulmonary micro nodules.  No new or enlarging nodule.  4. Additional findings as above.        Assessment and Plan     69 y.o.  male smoker with DM on insulin, COPD, melanoma and PAD (angioplasty w/o stent) here today for RLL squamous cell carcinoma. Pathology pT1cN0, stage IA NSCLC.   No indication for adjuvant therapy  Neuropathic pain - hypersensitivity   Smoking cessation strongly encouraged again      RTC in 6 months with chest CT

## 2024-12-03 ENCOUNTER — DOCUMENTATION ONLY (OUTPATIENT)
Dept: HEMATOLOGY/ONCOLOGY | Facility: CLINIC | Age: 69
End: 2024-12-03
Payer: MEDICARE

## 2024-12-03 ENCOUNTER — HOSPITAL ENCOUNTER (OUTPATIENT)
Dept: RADIOLOGY | Facility: HOSPITAL | Age: 69
Discharge: HOME | End: 2024-12-03
Attending: PHYSICIAN ASSISTANT
Payer: MEDICARE

## 2024-12-03 ENCOUNTER — OFFICE VISIT (OUTPATIENT)
Dept: PULMONOLOGY | Facility: CLINIC | Age: 69
End: 2024-12-03
Attending: PHYSICIAN ASSISTANT
Payer: MEDICARE

## 2024-12-03 VITALS
TEMPERATURE: 97 F | HEIGHT: 73 IN | HEART RATE: 75 BPM | SYSTOLIC BLOOD PRESSURE: 122 MMHG | BODY MASS INDEX: 24.37 KG/M2 | RESPIRATION RATE: 16 BRPM | DIASTOLIC BLOOD PRESSURE: 66 MMHG | OXYGEN SATURATION: 96 % | WEIGHT: 183.88 LBS

## 2024-12-03 DIAGNOSIS — I73.9 PERIPHERAL VASCULAR DISEASE: ICD-10-CM

## 2024-12-03 DIAGNOSIS — C34.32 MALIGNANT NEOPLASM OF LOWER LOBE OF LEFT LUNG: ICD-10-CM

## 2024-12-03 DIAGNOSIS — F17.200 CURRENT SMOKER: Primary | ICD-10-CM

## 2024-12-03 DIAGNOSIS — C34.90 MALIGNANT NEOPLASM OF UNSPECIFIED PART OF UNSPECIFIED BRONCHUS OR LUNG: ICD-10-CM

## 2024-12-03 DIAGNOSIS — C34.31 SQUAMOUS CELL CARCINOMA OF LOWER LOBE OF RIGHT LUNG: Primary | ICD-10-CM

## 2024-12-03 PROCEDURE — 71250 CT THORAX DX C-: CPT | Mod: 26,,, | Performed by: STUDENT IN AN ORGANIZED HEALTH CARE EDUCATION/TRAINING PROGRAM

## 2024-12-03 PROCEDURE — 99999 PR PBB SHADOW E&M-EST. PATIENT-LVL IV: CPT | Mod: PBBFAC,,, | Performed by: THORACIC SURGERY (CARDIOTHORACIC VASCULAR SURGERY)

## 2024-12-03 PROCEDURE — 71250 CT THORAX DX C-: CPT | Mod: TC,PO

## 2024-12-03 NOTE — PROGRESS NOTES
Patient saw Dr. Rowe in lung clinic this morning for a follow up visit.  Dr. Rowe's plan is for patient to follow up in 6 months with a non contrasted Chest CT.  Will schedule that visit and notify patient of appointment.

## (undated) DEVICE — DRAPE ABDOMINAL TIBURON 14X11

## (undated) DEVICE — DRESSING TRANS 4X4 TEGADERM

## (undated) DEVICE — SEAL UNIVERSAL 5MM-8MM XI

## (undated) DEVICE — TUBING SUC UNIV W/CONN 12FT

## (undated) DEVICE — SYR ONLY LUER LOCK 20CC

## (undated) DEVICE — ELECTRODE REM PLYHSV RETURN 9

## (undated) DEVICE — ELECTRODE BLADE INSULATED 1 IN

## (undated) DEVICE — PORT AIRSEAL 12/120MM LPI

## (undated) DEVICE — CLOSURE SKIN STERI STRIP 1/2X4

## (undated) DEVICE — DRAPE INCISE IOBAN 2 23X17IN

## (undated) DEVICE — KIT ANTIFOG W/SPONG & FLUID

## (undated) DEVICE — DRAPE ARM DAVINCI XI

## (undated) DEVICE — SUT MCRYL PLUS 4-0 PS2 27IN

## (undated) DEVICE — TRAY MINOR GEN SURG OMC

## (undated) DEVICE — GLOVE BIOGEL SKINSENSE PI 7.5

## (undated) DEVICE — DRESSING TEGADERM 2 3/8 X 2.75

## (undated) DEVICE — DRESSING SURGICAL 1X3

## (undated) DEVICE — SPONGE GAUZE 4X4 12 PLY STRL

## (undated) DEVICE — RELOAD SUREFORM 45 4.6 BLK 6R

## (undated) DEVICE — SUT 2-0 VICRYL / CT-1

## (undated) DEVICE — GOWN SMART IMP BREATHABLE XXLG

## (undated) DEVICE — RELOAD SUREFORM 45 3.5 BLU 6R

## (undated) DEVICE — SET TRI-LUMEN FILTERED TUBE

## (undated) DEVICE — COVER LIGHT HANDLE

## (undated) DEVICE — SUT 0 VICRYL / CT-1

## (undated) DEVICE — SUT SILK 0 STRANDS 30IN BLK

## (undated) DEVICE — CANNULA REDUCER 12-8MM

## (undated) DEVICE — CANNULA SEAL 12MM

## (undated) DEVICE — TAPE SILK 3IN

## (undated) DEVICE — NDL SPINAL 18GX3.5 SPINOCAN

## (undated) DEVICE — DRESSING ADH FILM TELFA 2X3IN

## (undated) DEVICE — STAPLER SUREFORM CRVD TIP 45

## (undated) DEVICE — SYR SLIP TIP 20CC

## (undated) DEVICE — ADHESIVE MASTISOL VIAL 48/BX

## (undated) DEVICE — SOL WATER STRL IRR 1000ML

## (undated) DEVICE — DRESSING TEGADERM 4.4X5IN

## (undated) DEVICE — DRAPE COLUMN DAVINCI XI

## (undated) DEVICE — SUT SILK 0 BLK BR FSL 18 IN

## (undated) DEVICE — DRESSING TRANS 2X2 TEGADERM

## (undated) DEVICE — DRAPE SCOPE PILLOW WARMER

## (undated) DEVICE — BAG INZII TISS RETRV 12/15MM

## (undated) DEVICE — RELOAD SUREFORM 45 2.5 WHT 6R

## (undated) DEVICE — CONTAINER SPECIMEN STRL 4OZ

## (undated) DEVICE — SUT VICRYL 3-0 27 SH